# Patient Record
Sex: FEMALE | Race: BLACK OR AFRICAN AMERICAN | NOT HISPANIC OR LATINO | Employment: OTHER | URBAN - METROPOLITAN AREA
[De-identification: names, ages, dates, MRNs, and addresses within clinical notes are randomized per-mention and may not be internally consistent; named-entity substitution may affect disease eponyms.]

---

## 2018-06-14 ENCOUNTER — OFFICE VISIT (OUTPATIENT)
Dept: URGENT CARE | Facility: CLINIC | Age: 53
End: 2018-06-14
Payer: MEDICAID

## 2018-06-14 VITALS
OXYGEN SATURATION: 99 % | BODY MASS INDEX: 48.37 KG/M2 | SYSTOLIC BLOOD PRESSURE: 128 MMHG | HEART RATE: 71 BPM | HEIGHT: 63 IN | TEMPERATURE: 98 F | DIASTOLIC BLOOD PRESSURE: 80 MMHG | WEIGHT: 273 LBS

## 2018-06-14 DIAGNOSIS — R21 RASH AND NONSPECIFIC SKIN ERUPTION: Primary | ICD-10-CM

## 2018-06-14 PROCEDURE — 99213 OFFICE O/P EST LOW 20 MIN: CPT | Mod: S$GLB,,, | Performed by: PHYSICIAN ASSISTANT

## 2018-06-14 RX ORDER — CETIRIZINE HYDROCHLORIDE 10 MG/1
10 TABLET ORAL DAILY
Qty: 10 TABLET | Refills: 0 | Status: SHIPPED | OUTPATIENT
Start: 2018-06-14 | End: 2023-07-25

## 2018-06-14 RX ORDER — PREDNISONE 20 MG/1
20 TABLET ORAL 2 TIMES DAILY
Qty: 8 TABLET | Refills: 0 | Status: SHIPPED | OUTPATIENT
Start: 2018-06-14 | End: 2018-06-18

## 2018-06-15 NOTE — PROGRESS NOTES
"Subjective:       Patient ID: Carmina Amador is a 52 y.o. female.    Vitals:  height is 5' 3" (1.6 m) and weight is 123.8 kg (273 lb). Her oral temperature is 98.2 °F (36.8 °C). Her blood pressure is 128/80 and her pulse is 71. Her oxygen saturation is 99%.     Chief Complaint: Rash    Patient complains of an itchy rash that has been present for the past 2 days.  Rash started on the right side of her abdomen but has since spread to her neck and back.  Patient denies any new medications, foods, soaps, detergents, or any other new exposures.  Patient denies taking any otc meds prior to arrival today.  Patient denies any other complaints at this time.        Rash   This is a new problem. The current episode started yesterday. The problem has been gradually worsening since onset. The affected locations include the back, neck, left shoulder and left arm. The rash is characterized by redness and itchiness. It is unknown (possibly to norco's, gingeroot) if there was an exposure to a precipitant. Pertinent negatives include no cough, diarrhea, fever, joint pain, shortness of breath, sore throat or vomiting. Treatments tried: allergy tablets. The treatment provided no relief.   Hand Pain    The incident occurred less than 1 hour ago. The incident occurred at home. Injury mechanism: pt's pointer finger went to open a door and it got caught. The pain is present in the left fingers. The pain does not radiate. The pain is at a severity of 2/10. The pain is mild. The pain has been improving since the incident. Pertinent negatives include no chest pain, numbness or tingling. The symptoms are aggravated by movement. She has tried nothing for the symptoms.     Review of Systems   Constitution: Negative for chills and fever.   HENT: Negative for sore throat.    Eyes: Negative for blurred vision.   Cardiovascular: Negative for chest pain.   Respiratory: Negative for cough, shortness of breath and wheezing.    Skin: Positive for itching " and rash.   Musculoskeletal: Negative for back pain and joint pain.   Gastrointestinal: Negative for abdominal pain, diarrhea, nausea and vomiting.   Neurological: Negative for headaches, numbness and tingling.   Psychiatric/Behavioral: The patient is not nervous/anxious.    All other systems reviewed and are negative.      Objective:      Physical Exam   Constitutional: She is oriented to person, place, and time. She appears well-developed and well-nourished.   HENT:   Head: Normocephalic and atraumatic. Head is without abrasion, without contusion and without laceration.   Right Ear: External ear normal.   Left Ear: External ear normal.   Nose: Nose normal.   Mouth/Throat: Oropharynx is clear and moist.   Eyes: Conjunctivae, EOM and lids are normal. Pupils are equal, round, and reactive to light.   Neck: Trachea normal, full passive range of motion without pain and phonation normal. Neck supple.   Cardiovascular: Normal rate, regular rhythm and normal heart sounds.    Pulmonary/Chest: Effort normal and breath sounds normal. No stridor. No respiratory distress.   Musculoskeletal: Normal range of motion.   Neurological: She is alert and oriented to person, place, and time.   Skin: Skin is warm, dry and intact. Capillary refill takes less than 2 seconds. Rash noted. No abrasion, no bruising, no burn, no ecchymosis, no laceration and no lesion noted. Rash is urticarial. No erythema.        Psychiatric: She has a normal mood and affect. Her speech is normal and behavior is normal. Judgment and thought content normal. Cognition and memory are normal.   Nursing note and vitals reviewed.      Assessment:       1. Rash and nonspecific skin eruption        Plan:         Rash and nonspecific skin eruption  -     predniSONE (DELTASONE) 20 MG tablet; Take 1 tablet (20 mg total) by mouth 2 (two) times daily.  Dispense: 8 tablet; Refill: 0  -     cetirizine (ZYRTEC) 10 MG tablet; Take 1 tablet (10 mg total) by mouth once daily.   Dispense: 10 tablet; Refill: 0      Patient Instructions   1.  Take all medications as directed. If you have been prescribed antibiotics, make sure to complete them.   2.  Rest and keep yourself/patient well hydrated. For adults, it is recommended to drink at least 8-10 glasses of water daily.   3.  For patients above 6 months of age who are not allergic to and are not on anticoagulants, you can alternate Tylenol and Motrin every 4-6 hours for fever above 100.4F and/or pain.  For patients less than 6 months of age, allergic to or intolerant to NSAIDS, have gastritis, gastric ulcers, or history of GI bleeds, are pregnant, or are on anticoagulant therapy, you can take Tylenol every 4 hours as needed for fever above 100.4F and/or pain.   4. You should schedule a follow-up appointment with your Primary Care Provider/Pediatrician for recheck in 2-3 days or as directed at this visit.   5.  If your condition fails to improve in a timely manner, you should receive another evaluation by your Primary Care Provider/Pediatrician to discuss your concerns or return to urgent care for a recheck.  If your condition worsens at any time, you should report immediately to your nearest Emergency Department for further evaluation. **You must understand that you have received Urgent Care treatment only and that you may be released before all of your medical problems are known or treated. You, the patient, are responsible to arrange for follow-up care as instructed.         Nonspecific Dermatitis  Dermatitis is a skin rash caused by something that touches the skin and makes it irritated and inflamed.  Your skin may be red, swollen, dry, and may be cracked. Blisters may form and ooze. The rash will itch.  Dermatitis can form on the face and neck, backs of hands, forearms, genitals, and lower legs. Dermatitis is not passed from person to person.  Talk with your health care provider about what may have caused the rash. Common things that  cause skin allergies are metal in jewelry, plants like poison ivy or poison oak, and certain skin care products. You will need to avoid the source of your rash in the future to prevent it from coming back. In some cases, the cause of the dermatitis may not be found.  Treatment is done to relieve itching and prevent the rash from coming back. The rash should go away in a few days to a few weeks.  Home care  The health care provider may prescribe medications to relieve swelling and itching. Follow all instructions when using these medications.  · Avoid anything that heats up your skin, such as hot showers or baths, or direct sunlight. This can make itching worse.  · Stay away from whatever you think caused the rash.  · Bathe in warm, not hot, water. Apply a moisturizing lotion after bathing to prevent dry skin.  · Avoid skin irritants such as wool or silk clothing, grease, oils, harsh soaps, and detergents.  · Apply cold compresses to soothe your sores to help relieve your symptoms. Do this for 30 minutes 3 to 4 times a day. You can make a cold compress by soaking a cloth in cold water. Squeeze out excess water. You can add colloidal oatmeal to the water to help reduce itching. For severe itching in a small area, apply an ice pack wrapped in a thin towel. Do this for 20 minutes 3 to 4 times a day.  · You can also help relieve large areas of itching by taking a lukewarm bath with colloidal oatmeal added to the water.  · Use hydrocortisone cream for redness and irritation, unless another medicine was prescribed. You can also use benzocaine anesthetic cream or spray.  · Use oral diphenhydramine to help reduce itching. This is an antihistamine you can buy at drug and grocery stores. It can make you sleepy, so use lower doses during the daytime. Or you can use loratadine. This is an antihistamine that will not make you sleepy. Dont use diphenhydramine if you have glaucoma or have trouble urinating because of an enlarged  prostate.  · Wash your hands or use an antibacterial gel often to prevent the spread of the rash.  Follow-up care  Follow up with your health care provider. Make an appointment with your health care provider if your symptoms do not get better in the next 1 to 2 weeks.  When to seek medical advice  Call your health care provider right away if any of these occur:  · Spreading of the rash to other parts of your body  · Severe swelling of your face, eyelids, mouth, throat or tongue  · Trouble urinating due to swelling in the genital area  · Fever of 100.4°F (38°C) or higher  · Redness or swelling that gets worse  · Pain that gets worse  · Foul-smelling fluid leaking from the skin  · Yellow-brown crusts on the open blisters  · Joint pain   Date Last Reviewed: 7/23/2014  © 2993-0527 [employer]. [address] [place] [address], [place] [address]. All rights reserved. This information is not intended as a substitute for professional medical care. Always follow your healthcare professional's instructions.

## 2018-06-15 NOTE — PATIENT INSTRUCTIONS
1.  Take all medications as directed. If you have been prescribed antibiotics, make sure to complete them.   2.  Rest and keep yourself/patient well hydrated. For adults, it is recommended to drink at least 8-10 glasses of water daily.   3.  For patients above 6 months of age who are not allergic to and are not on anticoagulants, you can alternate Tylenol and Motrin every 4-6 hours for fever above 100.4F and/or pain.  For patients less than 6 months of age, allergic to or intolerant to NSAIDS, have gastritis, gastric ulcers, or history of GI bleeds, are pregnant, or are on anticoagulant therapy, you can take Tylenol every 4 hours as needed for fever above 100.4F and/or pain.   4. You should schedule a follow-up appointment with your Primary Care Provider/Pediatrician for recheck in 2-3 days or as directed at this visit.   5.  If your condition fails to improve in a timely manner, you should receive another evaluation by your Primary Care Provider/Pediatrician to discuss your concerns or return to urgent care for a recheck.  If your condition worsens at any time, you should report immediately to your nearest Emergency Department for further evaluation. **You must understand that you have received Urgent Care treatment only and that you may be released before all of your medical problems are known or treated. You, the patient, are responsible to arrange for follow-up care as instructed.         Nonspecific Dermatitis  Dermatitis is a skin rash caused by something that touches the skin and makes it irritated and inflamed.  Your skin may be red, swollen, dry, and may be cracked. Blisters may form and ooze. The rash will itch.  Dermatitis can form on the face and neck, backs of hands, forearms, genitals, and lower legs. Dermatitis is not passed from person to person.  Talk with your health care provider about what may have caused the rash. Common things that cause skin allergies are metal in jewelry, plants like poison  ivy or poison oak, and certain skin care products. You will need to avoid the source of your rash in the future to prevent it from coming back. In some cases, the cause of the dermatitis may not be found.  Treatment is done to relieve itching and prevent the rash from coming back. The rash should go away in a few days to a few weeks.  Home care  The health care provider may prescribe medications to relieve swelling and itching. Follow all instructions when using these medications.  · Avoid anything that heats up your skin, such as hot showers or baths, or direct sunlight. This can make itching worse.  · Stay away from whatever you think caused the rash.  · Bathe in warm, not hot, water. Apply a moisturizing lotion after bathing to prevent dry skin.  · Avoid skin irritants such as wool or silk clothing, grease, oils, harsh soaps, and detergents.  · Apply cold compresses to soothe your sores to help relieve your symptoms. Do this for 30 minutes 3 to 4 times a day. You can make a cold compress by soaking a cloth in cold water. Squeeze out excess water. You can add colloidal oatmeal to the water to help reduce itching. For severe itching in a small area, apply an ice pack wrapped in a thin towel. Do this for 20 minutes 3 to 4 times a day.  · You can also help relieve large areas of itching by taking a lukewarm bath with colloidal oatmeal added to the water.  · Use hydrocortisone cream for redness and irritation, unless another medicine was prescribed. You can also use benzocaine anesthetic cream or spray.  · Use oral diphenhydramine to help reduce itching. This is an antihistamine you can buy at drug and grocery stores. It can make you sleepy, so use lower doses during the daytime. Or you can use loratadine. This is an antihistamine that will not make you sleepy. Dont use diphenhydramine if you have glaucoma or have trouble urinating because of an enlarged prostate.  · Wash your hands or use an antibacterial gel often  to prevent the spread of the rash.  Follow-up care  Follow up with your health care provider. Make an appointment with your health care provider if your symptoms do not get better in the next 1 to 2 weeks.  When to seek medical advice  Call your health care provider right away if any of these occur:  · Spreading of the rash to other parts of your body  · Severe swelling of your face, eyelids, mouth, throat or tongue  · Trouble urinating due to swelling in the genital area  · Fever of 100.4°F (38°C) or higher  · Redness or swelling that gets worse  · Pain that gets worse  · Foul-smelling fluid leaking from the skin  · Yellow-brown crusts on the open blisters  · Joint pain   Date Last Reviewed: 7/23/2014  © 4771-3895 [employer]. [address] [place] [address], [place] [address]. All rights reserved. This information is not intended as a substitute for professional medical care. Always follow your healthcare professional's instructions.

## 2019-07-31 ENCOUNTER — OFFICE VISIT (OUTPATIENT)
Dept: URGENT CARE | Facility: CLINIC | Age: 54
End: 2019-07-31
Payer: MEDICAID

## 2019-07-31 VITALS
WEIGHT: 268 LBS | HEART RATE: 80 BPM | SYSTOLIC BLOOD PRESSURE: 126 MMHG | OXYGEN SATURATION: 99 % | HEIGHT: 64 IN | DIASTOLIC BLOOD PRESSURE: 72 MMHG | BODY MASS INDEX: 45.75 KG/M2 | TEMPERATURE: 97 F

## 2019-07-31 DIAGNOSIS — S60.229A CONTUSION OF HAND, UNSPECIFIED LATERALITY, INITIAL ENCOUNTER: ICD-10-CM

## 2019-07-31 DIAGNOSIS — S69.92XA INJURY OF LEFT HAND, INITIAL ENCOUNTER: Primary | ICD-10-CM

## 2019-07-31 DIAGNOSIS — S69.91XA INJURY OF RIGHT HAND, INITIAL ENCOUNTER: ICD-10-CM

## 2019-07-31 PROCEDURE — 73130 X-RAY EXAM OF HAND: CPT | Mod: RT,S$GLB,, | Performed by: RADIOLOGY

## 2019-07-31 PROCEDURE — 99214 OFFICE O/P EST MOD 30 MIN: CPT | Mod: S$GLB,,, | Performed by: FAMILY MEDICINE

## 2019-07-31 PROCEDURE — 73130 X-RAY EXAM OF HAND: CPT | Mod: LT,S$GLB,, | Performed by: RADIOLOGY

## 2019-07-31 PROCEDURE — 73130 XR HAND COMPLETE 3 VIEW LEFT: ICD-10-PCS | Mod: LT,S$GLB,, | Performed by: RADIOLOGY

## 2019-07-31 PROCEDURE — 99214 PR OFFICE/OUTPT VISIT, EST, LEVL IV, 30-39 MIN: ICD-10-PCS | Mod: S$GLB,,, | Performed by: FAMILY MEDICINE

## 2019-07-31 RX ORDER — NAPROXEN 500 MG/1
500 TABLET ORAL 2 TIMES DAILY WITH MEALS
Qty: 20 TABLET | Refills: 0 | Status: SHIPPED | OUTPATIENT
Start: 2019-07-31 | End: 2020-01-08 | Stop reason: ALTCHOICE

## 2019-07-31 NOTE — PATIENT INSTRUCTIONS
Please drink plenty of fluids.  Please get plenty of rest.  Please return here or go to the Emergency Department for any concerns or worsening of condition.  If you were prescribed a narcotic medication, do not drive or operate heavy equipment or machinery while taking these medications.  If you were not prescribed an anti-inflammatory medication, and if you do not have any history of stomach/intestinal ulcers, or kidney disease, or are not taking a blood thinner such as Coumadin, Plavix, Pradaxa, Eloquis, or Xaralta for example, it is OK to take over the counter Ibuprofen or Advil or Motrin or Aleve as directed.  Do not take these medications on an empty stomach.  Rest, ice, compression and elevation to the affected joint or limb as needed.    If you were given a sling, wear it for comfort until follow up as arranged.  If you were given or placed in a splint, wear it until your follow up visit or recheck.  If you  smoke, please stop smoking.       Please follow up with your primary care doctor or specialist as needed.    Natalie Price MD  [phone]    You must understand that you have received treatment at an Urgent Care facility only, and that you may be  released before all of your medical problems are known or treated. Urgent Care facilities are not equipped to  handle life threatening emergencies. It is recommended that you seek care at an Emergency Department for  further evaluation of worsening or concerning symptoms, or possibly life threatening conditions as  discussed.      Hand Contusion  You have a contusion. This is also called a bruise. There is swelling and some bleeding under the skin, but no broken bones. This injury generally takes a few days to a few weeks to heal.  During that time, the bruise will typically change in color from reddish, to purple-blue, to greenish-yellow, then to yellow-brown.  Home care  · Elevate the hand to reduce pain and swelling. As much as possible, sit or lie down  with the hand raised about the level of your heart. This is especially important during the first 48 hours.  · Ice the hand to help reduce pain and swelling. Wrap a cold source (ice pack or ice cubes in a plastic bag) in a thin towel. Apply to the bruised area for 20 minutes every 1 to 2 hours the first day. Continue this 3 to 4 times a day until the pain and swelling goes away.  · Unless another medicine was prescribed, you can take acetaminophen, ibuprofen, or naproxen to control pain. (If you have chronic liver or kidney disease or ever had a stomach ulcer or gastrointestinal bleeding, talk with your doctor before using these medicines.)  Follow up  Follow up with your healthcare provider or our staff as advised. Call if you are not improving within 1 to 2 weeks.  When to seek medical advice   Call your healthcare provider right away if you have any of the following:  · Increased pain or swelling  · Arm becomes cold, blue, numb or tingly  · Signs of infection: Warmth, drainage, or increased redness or pain around the bruise  · Inability to move the injured hand   · Frequent bruising for unknown reasons  Date Last Reviewed: 2/1/2017  © 9332-0970 [employer]. [address] [place] [address], [place] [address]. All rights reserved. This information is not intended as a substitute for professional medical care. Always follow your healthcare professional's instructions.

## 2019-08-03 ENCOUNTER — TELEPHONE (OUTPATIENT)
Dept: URGENT CARE | Facility: CLINIC | Age: 54
End: 2019-08-03

## 2019-10-31 ENCOUNTER — OFFICE VISIT (OUTPATIENT)
Dept: URGENT CARE | Facility: CLINIC | Age: 54
End: 2019-10-31
Payer: MEDICAID

## 2019-10-31 VITALS
DIASTOLIC BLOOD PRESSURE: 63 MMHG | HEART RATE: 69 BPM | WEIGHT: 270 LBS | OXYGEN SATURATION: 99 % | SYSTOLIC BLOOD PRESSURE: 135 MMHG | TEMPERATURE: 97 F | BODY MASS INDEX: 46.35 KG/M2

## 2019-10-31 DIAGNOSIS — M25.561 RIGHT ANTERIOR KNEE PAIN: ICD-10-CM

## 2019-10-31 DIAGNOSIS — S00.511A ABRASION OF LIP, INITIAL ENCOUNTER: Primary | ICD-10-CM
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
## 2019-10-31 DIAGNOSIS — M79.18 CERVICAL MYOFASCIAL PAIN SYNDROME: ICD-10-CM

## 2019-10-31 PROCEDURE — 73562 X-RAY EXAM OF KNEE 3: CPT | Mod: RT,S$GLB,, | Performed by: RADIOLOGY

## 2019-10-31 PROCEDURE — 72040 XR CERVICAL SPINE 2 OR 3 VIEWS: ICD-10-PCS | Mod: S$GLB,,, | Performed by: RADIOLOGY

## 2019-10-31 PROCEDURE — 99214 PR OFFICE/OUTPT VISIT, EST, LEVL IV, 30-39 MIN: ICD-10-PCS | Mod: S$GLB,,, | Performed by: NURSE PRACTITIONER

## 2019-10-31 PROCEDURE — 72040 X-RAY EXAM NECK SPINE 2-3 VW: CPT | Mod: S$GLB,,, | Performed by: RADIOLOGY

## 2019-10-31 PROCEDURE — 99214 OFFICE O/P EST MOD 30 MIN: CPT | Mod: S$GLB,,, | Performed by: NURSE PRACTITIONER

## 2019-10-31 PROCEDURE — 73562 XR KNEE 3 VIEW RIGHT: ICD-10-PCS | Mod: RT,S$GLB,, | Performed by: RADIOLOGY

## 2019-10-31 NOTE — PROGRESS NOTES
Subjective:       Patient ID: Carmina Amador is a 54 y.o. female.    Vitals:  weight is 122.5 kg (270 lb). Her oral temperature is 97 °F (36.1 °C). Her blood pressure is 135/63 and her pulse is 69. Her oxygen saturation is 99%.     Chief Complaint: Facial Injury (lip injury)    Pt states she got up to go throw something away, caught foot on a table leg and tripped hitting right knee and face on tile floor. No loc, no dizziness, no vision changes, no cp or sob, no vomiting, no numbness, tingling, numbness or weakness to extremities. Pt had no symptoms prior fall, states it was an accident.     Facial Injury    The incident occurred 1 to 3 hours ago. The injury mechanism was a fall. There was no loss of consciousness. The volume of blood lost was minimal. The quality of the pain is described as aching. The pain is at a severity of 8/10. The pain is moderate. The pain has been constant since the injury. Pertinent negatives include no blurred vision, headaches, memory loss, numbness, vomiting or weakness. She has tried ice for the symptoms. The treatment provided mild relief.       Constitution: Negative for chills, fever and generalized weakness.   HENT: Positive for facial swelling and facial trauma. Negative for ear pain, ear discharge, trouble swallowing and voice change.    Neck: Negative for neck pain, neck stiffness, painful lymph nodes and neck swelling.   Cardiovascular: Negative for chest trauma and chest pain.   Eyes: Negative for eye trauma, eye pain, photophobia, double vision and blurred vision.   Respiratory: Negative for chest tightness and shortness of breath.    Gastrointestinal: Negative for abdominal trauma, abdominal pain, vomiting and rectal bleeding.   Genitourinary: Negative for genital trauma and pelvic pain.   Musculoskeletal: Positive for pain, trauma and joint pain (right knee). Negative for joint swelling and abnormal ROM of joint.   Skin: Positive for wound (oral). Negative for color change,  abrasion, laceration and bruising.   Neurological: Negative for dizziness, light-headedness, passing out, facial drooping, speech difficulty, coordination disturbances, headaches, altered mental status, loss of consciousness, numbness and tingling.   Hematologic/Lymphatic: Negative for swollen lymph nodes, easy bruising/bleeding and history of bleeding disorder. Does not bruise/bleed easily.   Psychiatric/Behavioral: Negative for altered mental status.       Objective:      Physical Exam   Constitutional: She is oriented to person, place, and time. She appears well-developed and well-nourished. She is cooperative.  Non-toxic appearance. She does not have a sickly appearance. She does not appear ill. No distress.   HENT:   Head: Normocephalic. Head is with abrasion (lips). Head is without Whitaker's sign, without contusion and without laceration.   Right Ear: Hearing, tympanic membrane, external ear and ear canal normal. No hemotympanum.   Left Ear: Hearing, tympanic membrane, external ear and ear canal normal. No hemotympanum.   Nose: Nose normal. No mucosal edema, rhinorrhea or nasal deformity. No epistaxis. Right sinus exhibits no maxillary sinus tenderness and no frontal sinus tenderness. Left sinus exhibits no maxillary sinus tenderness and no frontal sinus tenderness.   Mouth/Throat: Uvula is midline, oropharynx is clear and moist and mucous membranes are normal. Mucous membranes are not pale. She has dentures (upper, no notable broken area). Oral lesions present. No trismus in the jaw. Normal dentition. Dental caries (several lower teeth have been previously pulled, has front teeth, no dental tenderness or notable looseness or fracture noted to lower teeth) present. No dental abscesses or uvula swelling. No posterior oropharyngeal edema, posterior oropharyngeal erythema, tonsillar abscesses or cobblestoning. Tonsils are 0 on the right. Tonsils are 0 on the left.       No ttp over TMJ, no trismus. Airway patent.  No tongue injury.   Eyes: Pupils are equal, round, and reactive to light. Conjunctivae, EOM and lids are normal. Right eye exhibits no discharge. Left eye exhibits no discharge. No scleral icterus.   Neck: Trachea normal, normal range of motion, full passive range of motion without pain and phonation normal. Neck supple. No tracheal tenderness, no spinous process tenderness and no muscular tenderness present. No neck rigidity. No tracheal deviation, no edema and normal range of motion (Normal flexion and extension and left/right rotation) present.       Cardiovascular: Normal rate, regular rhythm, normal heart sounds, intact distal pulses and normal pulses.   No murmur heard.  Pulmonary/Chest: Effort normal and breath sounds normal. No respiratory distress.   Abdominal: Soft. Normal appearance and bowel sounds are normal. She exhibits no distension, no pulsatile midline mass and no mass. There is no tenderness.   Musculoskeletal: Normal range of motion. She exhibits no edema or deformity.        Right hip: Normal. She exhibits normal range of motion, normal strength, no tenderness, no bony tenderness, no swelling, no crepitus, no deformity and no laceration.        Right knee: She exhibits bony tenderness (patellar). She exhibits normal range of motion, no swelling, no effusion, no ecchymosis, no deformity, no laceration, no erythema, normal alignment, no LCL laxity, normal patellar mobility, normal meniscus and no MCL laxity. Tenderness found. Patellar tendon (tendon to overlying patellar) tenderness noted.        Left knee: Normal. She exhibits normal range of motion, no swelling, no effusion, no ecchymosis, no deformity and no bony tenderness. No tenderness found.        Right ankle: She exhibits normal range of motion, no swelling, no ecchymosis, no deformity, no laceration and normal pulse. No tenderness. No lateral malleolus, no medial malleolus, no AITFL, no CF ligament, no posterior TFL, no head of 5th  metatarsal and no proximal fibula tenderness found. Achilles tendon exhibits no pain, no defect and normal Silver's test results.        Thoracic back: Normal. She exhibits normal range of motion, no tenderness, no bony tenderness, no swelling, no edema, no deformity, no laceration, no pain, no spasm and normal pulse.        Lumbar back: Normal. She exhibits normal range of motion, no tenderness, no bony tenderness, no swelling, no edema, no deformity, no laceration, no pain, no spasm and normal pulse.   Right knee stable, no deformity, no abrasion, no visible contusion. Knee is stable. Above and below joints normal. ambulating with normal gait, no difficulty.   Neurological: She is alert and oriented to person, place, and time. She has normal strength. She displays no atrophy and no tremor. No cranial nerve deficit or sensory deficit. She exhibits normal muscle tone. She displays no seizure activity. Coordination normal. GCS eye subscore is 4. GCS verbal subscore is 5. GCS motor subscore is 6.   Skin: Skin is warm, dry, intact, not diaphoretic and not pale. Capillary refill takes less than 2 seconds. not right knee and not right ankleabrasion, burn, bruising and ecchymosis  Psychiatric: She has a normal mood and affect. Her speech is normal and behavior is normal. Judgment and thought content normal. Cognition and memory are normal.   Nursing note and vitals reviewed.        Assessment:       1. Right anterior knee pain    2. Fall, initial encounter    3. Abrasion of lip, initial encounter    4. Cervical myofascial pain syndrome        Plan:     xrays of neck and right knee with OA evidence which pt states she is aware of already, no fractures. No red flags on exam. BP elevation, no evidence end organ damage on exam, pt with htn.     Right anterior knee pain  -     XR KNEE 3 VIEW RIGHT; Future; Expected date: 10/31/2019    Fall, initial encounter    Abrasion of lip, initial encounter    Cervical myofascial pain  syndrome  -     X-Ray Cervical Spine 2 or 3 Views; Future; Expected date: 10/31/2019    Xr Knee 3 View Right    Result Date: 10/31/2019  EXAMINATION: XR KNEE 3 VIEW RIGHT CLINICAL HISTORY: fall with anterior knee pain; Pain in right knee TECHNIQUE: AP, lateral, and Merchant views of the right knee were performed. COMPARISON: 04/01/2019 FINDINGS: Three views right knee. Degenerative changes are noted of the knee, most significantly involving the lateral compartment.  No large knee joint effusion.  No acute displaced fracture or dislocation of the knee.     1. No acute displaced fracture or dislocation of the knee. Electronically signed by: Elliot Morris MD Date:    10/31/2019 Time:    13:22    X-ray Cervical Spine 2 Or 3 Views    Result Date: 10/31/2019  EXAMINATION: XR CERVICAL SPINE 2 OR 3 VIEWS CLINICAL HISTORY: fall, no midline ttp;  Myalgia, other site FINDINGS: Three views: There is moderate DJD between C3 and T1.  The odontoid prevertebral soft tissues and posterior elements are intact.  No fracture, dislocation bone destruction seen.     DJD. Electronically signed by: Amilcar Cavanaugh MD Date:    10/31/2019 Time:    13:23       Patient Instructions       Tylenol as per bottle instructions as needed for pain.  Ice to knee and lip as needed for pain and swelling next 48hrs.     Elevated Blood Pressure  Your blood pressure was elevated during your visit to the urgent care today.  It was not so high that immediate care was needed, but it is recommended that you monitor your blood pressure over the next week or two to make sure that it is not staying elevated.  If you are on blood pressure medication currently, continue as already prescribed. Please have your blood pressure taken 2-3 times daily at different times of the day.  Keep a log of these blood pressure readings and take it with you to see your Primary Care Physician.  Bring today's discharge papers as well to your follow up appointment. If your blood  pressure is consistently above 140/90, you should follow-up with your PCP without delay. If you develop chest pain, shortness of breath, dizziness, vision changes, or any other concerning symptoms, you should seek immediate care in the Emergency Department.          Lip or Mouth Laceration  A laceration is a cut through the skin. When the cut is on the outside of the lip, it may be closed with stitches, surgical tape, or skin glue. Cuts inside the mouth may be sutured or left open, depending on the size. When stitches are used in the mouth, they are usually the kind that dissolve.  A tetanus shot may be given if you are not current on this vaccination and the object that caused the cut may lead to tetanus.    Home care  If you were given an antibiotic to prevent infection, do not stop taking this medication until you have finished the prescribed course or the healthcare provider tells you to stop.  The healthcare provider may prescribe medications for pain. Follow instructions for taking these medications.   Follow the healthcare providers instructions on how to care for the cut.  Wash your hands with soap and warm water before and after caring for your cut. This helps prevent infection.  If the cut is inside your mouth, clean the wound by rinsing your mouth after each meal and at bedtime with a mixture of equal parts water and hydrogen peroxide. (Do not swallow!) Or, you can use a cotton swab to apply hydrogen peroxide directly onto the cut.  Mouth wounds can cause pain when chewing. Soft foods can help with this. If needed, use a local, over-the-counter numbing solution for pain relief, such as one for teething babies. Apply this directly to the wound with a cotton-tip swab or your finger.  If the wound is bandaged, leave the original bandage in place for 24 hours. Replace it if it becomes wet or dirty. After 24 hours, change it once a day or as directed.  Clean the wound daily. First, remove the bandage. Then  wash the area gently with soap and warm water, or as directed by your childs provider. Use a wet cotton swab to loosen and remove any blood or crust that forms. After cleaning, apply a thin layer of antibiotic ointment if advised. Then put on a new bandage.  If the cut is on the outside of the lip and sutures were used, you may shower as usual after the first 24 hours, but do not put your head under water or swim until the sutures are removed. After removing the bandage, wash the area with soap and water. Use a wet cotton swab to loosen and remove any blood or crust that forms. After cleaning, keep the wound clean and dry. Talk with your doctor before applying any antibiotic ointment to the wound. You may apply an adhesive bandage or leave the wound open. Unless told otherwise, sutures on the inside of the mouth will likely dissolve on their own.  If skin glue was used, do not scratch, rub, or pick at the adhesive film. Do not place tape directly over the film. Do not apply liquid, ointment, or creams to the wound while the film is in place. Do not clean the wound with peroxide and do not apply ointment. Avoid activities that cause heavy sweating until the film has fallen off. Protect the wound from prolonged exposure to sunlight or tanning lamps. You may shower as usual but do not soak the wound in water (no swimming).  Follow-up care  Follow up with your healthcare provider as directed. If you have sutures that won't dissolve on their own, return as directed to have them removed.  When to seek medical advice  Call your healthcare provider right away if any of these occur:  Wound bleeding not controlled by direct pressure  Signs of infection, including increasing pain in the wound, increasing wound redness or swelling, or pus or bad odor coming from the wound  Fever of 100.4°F (38.ºC) or higher or as directed by your healthcare provider  Stitches come apart or fall out or surgical tape falls off before 5  days  Wound edges re-open  Wound changes colors  Numbness around the wound   Date Last Reviewed: 6/10/2015  © 7469-2994 [employer]. [address] [place] [address], [place] [address]. All rights reserved. This information is not intended as a substitute for professional medical care. Always follow your healthcare professional's instructions.      Knee Pain  Knee pain is very common. Its especially common in active people who put a lot of pressure on their knees, like runners. It affects women more often than men.  Your kneecap (patella) is a thick, round bone. It covers and protects the front portion of your knee joint. It moves along a groove in your thighbone (femur) as part of the patellofemoral joint. A layer of cartilage surrounds the underside of your kneecap. This layer protects it from grinding against your femur.  When this cartilage softens and breaks down, it can cause knee pain. This is partly because of repetitive stress. The stress irritates the lining of the joint. This causes pain in the underlying bone.  What causes knee pain?  Many things can cause knee pain. You may have more than one cause. Some of these include:  Overuse of the knee joint  The kneecap doesnt line up with the tissue around it  Damage to small nerves in the area  Damage to the ligament-like structure that holds the kneecap in place (retinaculum)  Breakdown of the bone under the cartilage  Swelling in the soft tissues around the kneecap  Injury  You might be more likely to have knee pain if you:  Exercise a lot  Recently increased the intensity of your workouts  Have a body mass index (BMI) greater than 25  Have poor alignment of your kneecap  Walk with your feet turned overly outward or inward  Have weakness in surrounding muscle groups (inner quad or hip adductor muscles)  Have too much tightness in surrounding muscle groups (hamstrings or iliotibial band)  Have a recent history of injury to the area  Are female  Symptoms  of knee pain  This type of knee pain is a dull, aching pain in the front of the knee in the area under and around the kneecap. This pain may start quickly or slowly. Your pain might be worse when you squat, run, or sit for a long time. You might also sometimes feel like your knee is giving out. You may have symptoms in one or both of your knees.  Diagnosing knee pain  Your healthcare provider will ask about your medical history and your symptoms. Be sure to describe any activities that make your knee pain worse. He or she will look at your knee. This will include tests of your range of motion, strength, and areas of pain of your knee. Your knee alignment will be checked.  Your healthcare provider will need to rule out other causes of your knee pain, such as arthritis. You may need an imaging test, such as an X-ray or MRI.  Treatment for knee pain  Treatments that can help ease your symptoms may include:  Avoiding activities for a while that make your pain worse, returning to activity over time  Icing the outside of your knee when it causes you pain  Taking over-the-counter pain medicine  Wearing a knee brace or taping your knee to support it  Wearing special shoe inserts to help keep your feet in the proper alignment  Doing special exercises to stretch and strengthen the muscles around your hip and your knee  These steps help most people manage knee pain. But some cases of knee pain need to be treated with surgery. You may need surgery right away. Or you may need it later if other treatments dont work. Your healthcare provider may refer you to an orthopedic surgeon. He or she will talk with you about your choices.  Preventing knee pain  Losing weight and correcting excess muscle tightness or muscle weakness may help lower your risk.  In some cases, you can prevent knee pain. To help prevent a flare-up of knee pain, you do these things:  Regularly do all the exercises your doctor or physical therapist  advises  Support your knee as advised by your doctor or physical therapist  Increase training gradually, and ease up on training when needed  Have an expert check your gait for running or other sporting activities  Stretch properly before and after exercise  Replace your running shoes regularly  Lose excess weight     When to call your healthcare provider  Call your healthcare provider right away if:  Your symptoms dont get better after a few weeks of treatment  You have any new symptoms   Date Last Reviewed: 4/1/2017  © 5991-1551 [employer]. [address] [place] [id number]. All rights reserved. This information is not intended as a substitute for professional medical care. Always follow your healthcare professional's instructions.        Understanding Cervical Strain    There are 7 bones (vertebrae) in the neck that are part of the spine. These are called the cervical spine. Cervical strain is a medical term for neck pain. The neck has several layers of muscles. These are connected with tendons to the cervical spine and other bones. Neck pain is often the result of injury to these muscles and tendons.  Causes of cervical strain  Different types of stress on the neck can damage muscles and tendons (soft tissues) and cause cervical strain. Cervical tissues can be damaged by:  The neck being forced past its normal range of motion, such as in a car accident or sports injury  Constant, low-level stress, such as from poor posture or a poorly set-up workspace  Symptoms of cervical strain  These may include:  Neck pain or stiffness  Pain in the shoulders or upper back  Muscle spasms  Headache, often starting at the base of the neck  Irritability, difficulty concentrating, or sleeplessness  Treatment for cervical strain  This problem often gets better on its own. Treatments aim to reduce pain and inflammation and increase the range of motion of the neck. Possible treatments  include:  Over-the-counter or prescription pain medicine. These help relieve pain and inflammation.  Stretching exercises to decrease neck stiffness.  Massage to decrease neck stiffness.  Cold or heat pack. These help reduce pain and swelling.  Call 911  Call emergency services right away if you have any of these:  Face drooping or numbness  Numbness or weakness, especially in the arms or on one side  Slurred speech or difficulty speaking  Blurred vision   When to call your healthcare provider  Call your healthcare provider right away if you have any of these:  Fever of 100.4°F (38°C) or higher, or as directed  Pain or stiffness that gets worse  Symptoms that dont get better, or get worse  Numbness, tingling, weakness or shooting pains into the arms or legs  New symptoms  Date Last Reviewed: 3/10/2016  © 4876-7084 [employer]. [address] [place] [address]. All rights reserved. This information is not intended as a substitute for professional medical care. Always follow your healthcare professional's instructions.        ·   ·   · Follow up with your primary care in 2-5 days if symptoms have not improved, or you may return here.  · If you were referred to a specialist, please follow up with that specialty.  · If you were prescribed antibiotics, please take them to completion.  · If you were prescribed a narcotic or any medication with sedative effects, do not drive or operate heavy equipment or machinery while taking these medications.  · You must understand that you have received treatment at an Urgent Care facility only, and that you may be released before all of your medical problems are known or treated. Urgent Care facilities are not equipped to handle life threatening emergencies. It is recommended that you go to an Emergency Department for further evaluation of worsening or concerning symptoms, or possibly life threatening conditions as discussed.                                         If you  smoke, please stop smoking

## 2019-10-31 NOTE — PATIENT INSTRUCTIONS
Tylenol as per bottle instructions as needed for pain.  Ice to knee and lip as needed for pain and swelling next 48hrs.     Elevated Blood Pressure  Your blood pressure was elevated during your visit to the urgent care today.  It was not so high that immediate care was needed, but it is recommended that you monitor your blood pressure over the next week or two to make sure that it is not staying elevated.  If you are on blood pressure medication currently, continue as already prescribed. Please have your blood pressure taken 2-3 times daily at different times of the day.  Keep a log of these blood pressure readings and take it with you to see your Primary Care Physician.  Bring today's discharge papers as well to your follow up appointment. If your blood pressure is consistently above 140/90, you should follow-up with your PCP without delay. If you develop chest pain, shortness of breath, dizziness, vision changes, or any other concerning symptoms, you should seek immediate care in the Emergency Department.          Lip or Mouth Laceration  A laceration is a cut through the skin. When the cut is on the outside of the lip, it may be closed with stitches, surgical tape, or skin glue. Cuts inside the mouth may be sutured or left open, depending on the size. When stitches are used in the mouth, they are usually the kind that dissolve.  A tetanus shot may be given if you are not current on this vaccination and the object that caused the cut may lead to tetanus.    Home care  If you were given an antibiotic to prevent infection, do not stop taking this medication until you have finished the prescribed course or the healthcare provider tells you to stop.  The healthcare provider may prescribe medications for pain. Follow instructions for taking these medications.   Follow the healthcare providers instructions on how to care for the cut.  Wash your hands with soap and warm water before and after caring for your cut. This  helps prevent infection.  If the cut is inside your mouth, clean the wound by rinsing your mouth after each meal and at bedtime with a mixture of equal parts water and hydrogen peroxide. (Do not swallow!) Or, you can use a cotton swab to apply hydrogen peroxide directly onto the cut.  Mouth wounds can cause pain when chewing. Soft foods can help with this. If needed, use a local, over-the-counter numbing solution for pain relief, such as one for teething babies. Apply this directly to the wound with a cotton-tip swab or your finger.  If the wound is bandaged, leave the original bandage in place for 24 hours. Replace it if it becomes wet or dirty. After 24 hours, change it once a day or as directed.  Clean the wound daily. First, remove the bandage. Then wash the area gently with soap and warm water, or as directed by your childs provider. Use a wet cotton swab to loosen and remove any blood or crust that forms. After cleaning, apply a thin layer of antibiotic ointment if advised. Then put on a new bandage.  If the cut is on the outside of the lip and sutures were used, you may shower as usual after the first 24 hours, but do not put your head under water or swim until the sutures are removed. After removing the bandage, wash the area with soap and water. Use a wet cotton swab to loosen and remove any blood or crust that forms. After cleaning, keep the wound clean and dry. Talk with your doctor before applying any antibiotic ointment to the wound. You may apply an adhesive bandage or leave the wound open. Unless told otherwise, sutures on the inside of the mouth will likely dissolve on their own.  If skin glue was used, do not scratch, rub, or pick at the adhesive film. Do not place tape directly over the film. Do not apply liquid, ointment, or creams to the wound while the film is in place. Do not clean the wound with peroxide and do not apply ointment. Avoid activities that cause heavy sweating until the film has  fallen off. Protect the wound from prolonged exposure to sunlight or tanning lamps. You may shower as usual but do not soak the wound in water (no swimming).  Follow-up care  Follow up with your healthcare provider as directed. If you have sutures that won't dissolve on their own, return as directed to have them removed.  When to seek medical advice  Call your healthcare provider right away if any of these occur:  Wound bleeding not controlled by direct pressure  Signs of infection, including increasing pain in the wound, increasing wound redness or swelling, or pus or bad odor coming from the wound  Fever of 100.4°F (38.ºC) or higher or as directed by your healthcare provider  Stitches come apart or fall out or surgical tape falls off before 5 days  Wound edges re-open  Wound changes colors  Numbness around the wound   Date Last Reviewed: 6/10/2015  © 9211-7705 [employer]. [address] [place] [address]. All rights reserved. This information is not intended as a substitute for professional medical care. Always follow your healthcare professional's instructions.      Knee Pain  Knee pain is very common. Its especially common in active people who put a lot of pressure on their knees, like runners. It affects women more often than men.  Your kneecap (patella) is a thick, round bone. It covers and protects the front portion of your knee joint. It moves along a groove in your thighbone (femur) as part of the patellofemoral joint. A layer of cartilage surrounds the underside of your kneecap. This layer protects it from grinding against your femur.  When this cartilage softens and breaks down, it can cause knee pain. This is partly because of repetitive stress. The stress irritates the lining of the joint. This causes pain in the underlying bone.  What causes knee pain?  Many things can cause knee pain. You may have more than one cause. Some of these include:  Overuse of the knee joint  The  kneecap doesnt line up with the tissue around it  Damage to small nerves in the area  Damage to the ligament-like structure that holds the kneecap in place (retinaculum)  Breakdown of the bone under the cartilage  Swelling in the soft tissues around the kneecap  Injury  You might be more likely to have knee pain if you:  Exercise a lot  Recently increased the intensity of your workouts  Have a body mass index (BMI) greater than 25  Have poor alignment of your kneecap  Walk with your feet turned overly outward or inward  Have weakness in surrounding muscle groups (inner quad or hip adductor muscles)  Have too much tightness in surrounding muscle groups (hamstrings or iliotibial band)  Have a recent history of injury to the area  Are female  Symptoms of knee pain  This type of knee pain is a dull, aching pain in the front of the knee in the area under and around the kneecap. This pain may start quickly or slowly. Your pain might be worse when you squat, run, or sit for a long time. You might also sometimes feel like your knee is giving out. You may have symptoms in one or both of your knees.  Diagnosing knee pain  Your healthcare provider will ask about your medical history and your symptoms. Be sure to describe any activities that make your knee pain worse. He or she will look at your knee. This will include tests of your range of motion, strength, and areas of pain of your knee. Your knee alignment will be checked.  Your healthcare provider will need to rule out other causes of your knee pain, such as arthritis. You may need an imaging test, such as an X-ray or MRI.  Treatment for knee pain  Treatments that can help ease your symptoms may include:  Avoiding activities for a while that make your pain worse, returning to activity over time  Icing the outside of your knee when it causes you pain  Taking over-the-counter pain medicine  Wearing a knee brace or taping your knee to support it  Wearing special shoe inserts  to help keep your feet in the proper alignment  Doing special exercises to stretch and strengthen the muscles around your hip and your knee  These steps help most people manage knee pain. But some cases of knee pain need to be treated with surgery. You may need surgery right away. Or you may need it later if other treatments dont work. Your healthcare provider may refer you to an orthopedic surgeon. He or she will talk with you about your choices.  Preventing knee pain  Losing weight and correcting excess muscle tightness or muscle weakness may help lower your risk.  In some cases, you can prevent knee pain. To help prevent a flare-up of knee pain, you do these things:  Regularly do all the exercises your doctor or physical therapist advises  Support your knee as advised by your doctor or physical therapist  Increase training gradually, and ease up on training when needed  Have an expert check your gait for running or other sporting activities  Stretch properly before and after exercise  Replace your running shoes regularly  Lose excess weight     When to call your healthcare provider  Call your healthcare provider right away if:  Your symptoms dont get better after a few weeks of treatment  You have any new symptoms   Date Last Reviewed: 4/1/2017  © 8206-4561 [employer]. [address] [place] [address]. All rights reserved. This information is not intended as a substitute for professional medical care. Always follow your healthcare professional's instructions.        Understanding Cervical Strain    There are 7 bones (vertebrae) in the neck that are part of the spine. These are called the cervical spine. Cervical strain is a medical term for neck pain. The neck has several layers of muscles. These are connected with tendons to the cervical spine and other bones. Neck pain is often the result of injury to these muscles and tendons.  Causes of cervical strain  Different types of stress on  the neck can damage muscles and tendons (soft tissues) and cause cervical strain. Cervical tissues can be damaged by:  The neck being forced past its normal range of motion, such as in a car accident or sports injury  Constant, low-level stress, such as from poor posture or a poorly set-up workspace  Symptoms of cervical strain  These may include:  Neck pain or stiffness  Pain in the shoulders or upper back  Muscle spasms  Headache, often starting at the base of the neck  Irritability, difficulty concentrating, or sleeplessness  Treatment for cervical strain  This problem often gets better on its own. Treatments aim to reduce pain and inflammation and increase the range of motion of the neck. Possible treatments include:  Over-the-counter or prescription pain medicine. These help relieve pain and inflammation.  Stretching exercises to decrease neck stiffness.  Massage to decrease neck stiffness.  Cold or heat pack. These help reduce pain and swelling.  Call 911  Call emergency services right away if you have any of these:  Face drooping or numbness  Numbness or weakness, especially in the arms or on one side  Slurred speech or difficulty speaking  Blurred vision   When to call your healthcare provider  Call your healthcare provider right away if you have any of these:  Fever of 100.4°F (38°C) or higher, or as directed  Pain or stiffness that gets worse  Symptoms that dont get better, or get worse  Numbness, tingling, weakness or shooting pains into the arms or legs  New symptoms  Date Last Reviewed: 3/10/2016  © 8874-2343 [employer]. [address] [place] [address], [place] [address]. All rights reserved. This information is not intended as a substitute for professional medical care. Always follow your healthcare professional's instructions.        ·   ·   · Follow up with your primary care in 2-5 days if symptoms have not improved, or you may return here.  · If you were referred to a specialist, please  follow up with that specialty.  · If you were prescribed antibiotics, please take them to completion.  · If you were prescribed a narcotic or any medication with sedative effects, do not drive or operate heavy equipment or machinery while taking these medications.  · You must understand that you have received treatment at an Urgent Care facility only, and that you may be released before all of your medical problems are known or treated. Urgent Care facilities are not equipped to handle life threatening emergencies. It is recommended that you go to an Emergency Department for further evaluation of worsening or concerning symptoms, or possibly life threatening conditions as discussed.                                        If you  smoke, please stop smoking

## 2019-11-01 NOTE — PROCEDURES
"Laceration Repair  Date/Time: 10/31/2019 11:45 AM  Performed by: Kadi Zee NP  Authorized by: Kadi Zee NP   Consent Done: Yes  Consent: Verbal consent obtained. Written consent not obtained.  Risks and benefits: risks, benefits and alternatives were discussed  Consent given by: patient  Patient understanding: patient states understanding of the procedure being performed  Patient identity confirmed:  and name  Time out: Immediately prior to procedure a "time out" was called to verify the correct patient, procedure, equipment, support staff and site/side marked as required.  Body area: mouth  Laceration length: 0.2 cm  Contaminated: none.  Foreign bodies: no foreign bodies  Tendon involvement: none  Nerve involvement: none  Vascular damage: no  Anesthesia method: none.    Anesthesia:  Local anesthesia used: no  Patient sedated: no  Preparation: Patient was prepped and draped in the usual sterile fashion.  Irrigation solution: saline  Irrigation method: syringe  Amount of cleaning: standard  Debridement: none  Skin closure: glue  Approximation: close  Approximation difficulty: simple  Patient tolerance: Patient tolerated the procedure well with no immediate complications        "

## 2019-11-04 ENCOUNTER — OFFICE VISIT (OUTPATIENT)
Dept: URGENT CARE | Facility: CLINIC | Age: 54
End: 2019-11-04
Payer: MEDICAID

## 2019-11-04 VITALS
OXYGEN SATURATION: 98 % | SYSTOLIC BLOOD PRESSURE: 127 MMHG | HEART RATE: 100 BPM | BODY MASS INDEX: 46.1 KG/M2 | WEIGHT: 270 LBS | HEIGHT: 64 IN | TEMPERATURE: 98 F | DIASTOLIC BLOOD PRESSURE: 79 MMHG

## 2019-11-04 DIAGNOSIS — S00.511A ABRASION OF LIP, INITIAL ENCOUNTER: Primary | ICD-10-CM

## 2019-11-04 PROCEDURE — 99214 OFFICE O/P EST MOD 30 MIN: CPT | Mod: S$GLB,,, | Performed by: FAMILY MEDICINE

## 2019-11-04 PROCEDURE — 99214 PR OFFICE/OUTPT VISIT, EST, LEVL IV, 30-39 MIN: ICD-10-PCS | Mod: S$GLB,,, | Performed by: FAMILY MEDICINE

## 2019-11-04 RX ORDER — NAPROXEN 500 MG/1
500 TABLET ORAL 2 TIMES DAILY WITH MEALS
Qty: 20 TABLET | Refills: 0 | Status: SHIPPED | OUTPATIENT
Start: 2019-11-04 | End: 2020-01-08 | Stop reason: ALTCHOICE

## 2019-11-04 RX ORDER — MUPIROCIN 20 MG/G
OINTMENT TOPICAL 2 TIMES DAILY
Qty: 30 G | Refills: 0 | Status: SHIPPED | OUTPATIENT
Start: 2019-11-04 | End: 2023-07-25

## 2019-11-04 RX ORDER — CEPHALEXIN 250 MG/1
250 CAPSULE ORAL 4 TIMES DAILY
Qty: 40 CAPSULE | Refills: 0 | Status: SHIPPED | OUTPATIENT
Start: 2019-11-04 | End: 2019-11-14

## 2019-11-04 NOTE — PATIENT INSTRUCTIONS
Please drink plenty of fluids.  Please get plenty of rest.  Please return here or go to the Emergency Department for any concerns or worsening of condition.  If you were given wait & see antibiotics, please wait 3-5 days before taking them, and only take them if your symptoms have worsened or not improved.  If you do begin taking the antibiotics, please take them to completion.  If you were prescribed antibiotics, please take them to completion.  If you were prescribed a narcotic medication, do not drive or operate heavy equipment or machinery while taking these medications.      If not allergic, please take over the counter Tylenol (Acetaminophen) and/or Motrin (Ibuprofen) as directed for control of pain and/or fever.    Please follow up with your primary care doctor or specialist as needed.  Natalie Price MD  [phone]    You must understand that you have received treatment at an Urgent Care facility only, and that you may be  released before all of your medical problems are known or treated. Urgent Care facilities are not equipped to  handle life threatening emergencies. It is recommended that you seek care at an Emergency Department for  further evaluation of worsening or concerning symptoms, or possibly life threatening conditions as  discussed.      Abrasions  Abrasions are skin scrapes. Their treatment depends on how large and deep the abrasion is.  Home care  You may be prescribed an antibiotic cream or ointment to apply to the wound. This helps prevent infection. Follow instructions when using this medicine.  General care  · To care for the abrasion, do the following each day for as long as directed by your healthcare provider.  ¨ If you were given a bandage, change it once a day. If your bandage sticks to the wound, soak it in warm water until it loosens.  ¨ Wash the area with soap and warm water. You may do this in a sink or under a tub faucet or shower. Rinse off the soap. Then pat the area dry  with a clean towel.  ¨ If antibiotic ointment or cream was prescribed, reapply it to the wound as directed. Cover the wound with a fresh nonstick bandage. If the bandage becomes wet or dirty, change it as soon as possible.  ¨ Some antibiotic ointments or cream can cause an allergic reaction or dermatitis. This may cause redness, itching and or hives. If this occurs, stop using the ointment immediately and wash off any remaining ointment. You may need to take some allergy medicine to relieve symptoms.  · You may use acetaminophen or ibuprofen to control pain unless another pain medicine was prescribed. Talk with your healthcare provider before using these medicines if you have chronic liver or kidney disease or ever had a stomach ulcer or GI bleeding. Dont use ibuprofen in children younger than six months old.  · Most skin wounds heal within 10 days. But an infection may occur even with treatment. So its important to watch the wound for signs of infection as listed below.  Follow-up care  Follow up with your healthcare provider, or as advised.  When to seek medical advice  Call your healthcare provider right away if any of these occur:  · Fever of 100.4ºF (38ºC) or higher, or as directed by your healthcare provider  · Increasing pain, redness, swelling, or drainage from the wound  · Bleeding from the wound that does not stop after a few minutes of steady, firm pressure  · Decreased ability to move any body part near the wound  Date Last Reviewed: 3/3/2017  © 3210-8195 [employer]. [address] [place] [address], [place] [address]. All rights reserved. This information is not intended as a substitute for professional medical care. Always follow your healthcare professional's instructions.

## 2019-11-04 NOTE — PROGRESS NOTES
"Subjective:       Patient ID: Carmina Amador is a 54 y.o. female.    Vitals:  height is 5' 4" (1.626 m) and weight is 122.5 kg (270 lb). Her oral temperature is 98.1 °F (36.7 °C). Her blood pressure is 127/79 and her pulse is 100. Her oxygen saturation is 98%.     Chief Complaint: Lip Laceration    Other   This is a new problem. Episode onset: 4 days ago. The problem occurs constantly. The problem has been gradually worsening. Pertinent negatives include no fatigue, nausea, neck pain or sore throat. Associated symptoms comments: Facial trauma. Nothing aggravates the symptoms.       Constitution: Negative for fatigue.   HENT: Positive for facial swelling (right side upper lip) and facial trauma. Negative for sore throat.    Neck: Negative for neck pain and neck stiffness.   Cardiovascular: Negative for chest trauma.   Eyes: Negative for eye trauma.   Gastrointestinal: Negative for nausea and rectal bleeding.   Genitourinary: Negative for pelvic pain.   Musculoskeletal: Positive for pain and trauma.   Skin: Positive for wound. Negative for color change, abrasion, laceration, erythema and bruising.   Neurological: Negative for loss of consciousness.       Objective:      Physical Exam   Constitutional: She is oriented to person, place, and time. She appears well-developed and well-nourished.   HENT:   Head: Normocephalic and atraumatic. Head is without abrasion, without contusion and without laceration.       Right Ear: External ear normal.   Left Ear: External ear normal.   Nose: Nose normal.   Mouth/Throat: Oropharynx is clear and moist and mucous membranes are normal.   Eyes: Pupils are equal, round, and reactive to light. Conjunctivae, EOM and lids are normal.   Neck: Trachea normal, full passive range of motion without pain and phonation normal. Neck supple.   Cardiovascular: Normal rate, regular rhythm and normal heart sounds.   Pulmonary/Chest: Effort normal and breath sounds normal. No stridor. No respiratory " distress.   Musculoskeletal: Normal range of motion.   Neurological: She is alert and oriented to person, place, and time.   Skin: Skin is warm, dry, intact and no rash. Capillary refill takes less than 2 seconds. abrasion, burn, bruising, erythema and ecchymosis  Psychiatric: She has a normal mood and affect. Her speech is normal and behavior is normal. Judgment and thought content normal. Cognition and memory are normal.   Nursing note and vitals reviewed.        Assessment:       1. Abrasion of lip, initial encounter        Plan:         Abrasion of lip, initial encounter  -     cephALEXin (KEFLEX) 250 MG capsule; Take 1 capsule (250 mg total) by mouth 4 (four) times daily. for 10 days  Dispense: 40 capsule; Refill: 0  -     mupirocin (BACTROBAN) 2 % ointment; Apply topically 2 (two) times daily.  Dispense: 30 g; Refill: 0  -     naproxen (NAPROSYN) 500 MG tablet; Take 1 tablet (500 mg total) by mouth 2 (two) times daily with meals.  Dispense: 20 tablet; Refill: 0     Please drink plenty of fluids.  Please get plenty of rest.  Please return here or go to the Emergency Department for any concerns or worsening of condition.  If you were given wait & see antibiotics, please wait 3-5 days before taking them, and only take them if your symptoms have worsened or not improved.  If you do begin taking the antibiotics, please take them to completion.  If you were prescribed antibiotics, please take them to completion.  If you were prescribed a narcotic medication, do not drive or operate heavy equipment or machinery while taking these medications.      If not allergic, please take over the counter Tylenol (Acetaminophen) and/or Motrin (Ibuprofen) as directed for control of pain and/or fever.    Please follow up with your primary care doctor or specialist as needed.  Natalie Price MD  [phone]    You must understand that you have received treatment at an Urgent Care facility only, and that you may be  released  before all of your medical problems are known or treated. Urgent Care facilities are not equipped to  handle life threatening emergencies. It is recommended that you seek care at an Emergency Department for  further evaluation of worsening or concerning symptoms, or possibly life threatening conditions as  discussed.

## 2020-01-08 ENCOUNTER — OFFICE VISIT (OUTPATIENT)
Dept: URGENT CARE | Facility: CLINIC | Age: 55
End: 2020-01-08
Payer: MEDICAID

## 2020-01-08 VITALS
SYSTOLIC BLOOD PRESSURE: 145 MMHG | DIASTOLIC BLOOD PRESSURE: 79 MMHG | HEIGHT: 64 IN | TEMPERATURE: 98 F | BODY MASS INDEX: 46.1 KG/M2 | HEART RATE: 76 BPM | OXYGEN SATURATION: 98 % | WEIGHT: 270 LBS

## 2020-01-08 DIAGNOSIS — M54.9 BACK PAIN, UNSPECIFIED BACK LOCATION, UNSPECIFIED BACK PAIN LATERALITY, UNSPECIFIED CHRONICITY: Primary | ICD-10-CM

## 2020-01-08 LAB
BILIRUB UR QL STRIP: NEGATIVE
GLUCOSE UR QL STRIP: NEGATIVE
KETONES UR QL STRIP: NEGATIVE
LEUKOCYTE ESTERASE UR QL STRIP: NEGATIVE
PH, POC UA: 5.5 (ref 5–8)
POC BLOOD, URINE: NEGATIVE
POC NITRATES, URINE: NEGATIVE
PROT UR QL STRIP: NEGATIVE
SP GR UR STRIP: 1.02 (ref 1–1.03)
UROBILINOGEN UR STRIP-ACNC: NORMAL (ref 0.1–1.1)

## 2020-01-08 PROCEDURE — 81003 POCT URINALYSIS, DIPSTICK, AUTOMATED, W/O SCOPE: ICD-10-PCS | Mod: QW,S$GLB,, | Performed by: PHYSICIAN ASSISTANT

## 2020-01-08 PROCEDURE — 81003 URINALYSIS AUTO W/O SCOPE: CPT | Mod: QW,S$GLB,, | Performed by: PHYSICIAN ASSISTANT

## 2020-01-08 PROCEDURE — 99214 PR OFFICE/OUTPT VISIT, EST, LEVL IV, 30-39 MIN: ICD-10-PCS | Mod: 25,S$GLB,, | Performed by: PHYSICIAN ASSISTANT

## 2020-01-08 PROCEDURE — 99214 OFFICE O/P EST MOD 30 MIN: CPT | Mod: 25,S$GLB,, | Performed by: PHYSICIAN ASSISTANT

## 2020-01-08 RX ORDER — NAPROXEN SODIUM 550 MG/1
550 TABLET ORAL 2 TIMES DAILY WITH MEALS
Qty: 20 TABLET | Refills: 0 | Status: SHIPPED | OUTPATIENT
Start: 2020-01-08 | End: 2020-01-18

## 2020-01-08 RX ORDER — BACLOFEN 10 MG/1
10 TABLET ORAL 2 TIMES DAILY
Qty: 20 TABLET | Refills: 0 | Status: SHIPPED | OUTPATIENT
Start: 2020-01-08 | End: 2023-07-21

## 2020-01-08 RX ORDER — PREDNISONE 20 MG/1
20 TABLET ORAL DAILY
Qty: 4 TABLET | Refills: 0 | Status: SHIPPED | OUTPATIENT
Start: 2020-01-08 | End: 2020-01-12

## 2020-01-08 NOTE — PROGRESS NOTES
"Subjective:       Patient ID: Carmina Amador is a 54 y.o. female.    Vitals:  height is 5' 4" (1.626 m) and weight is 122.5 kg (270 lb). Her oral temperature is 98.2 °F (36.8 °C). Her blood pressure is 145/79 (abnormal) and her pulse is 76. Her oxygen saturation is 98%.     Chief Complaint: Back Pain    54-year-old female presents to clinic today with complaints to her left groin area and her left lower back area.  Patient states that the pain started during her stress test last week and has persisted since.  Patient states that she had shingles in this area previously and describes the pain as a similar pain. She does report improvement in her pain with Aleve and Tylenol as well as heat.  Patient denies any fever, chills, nausea, vomiting, or urinary symptoms.  She also reports normal bowel movements.  Patient denies any other complaints at this time.     Back Pain   This is a new problem. The current episode started in the past 7 days. The problem occurs intermittently. The problem has been waxing and waning since onset. The pain is present in the lumbar spine. The quality of the pain is described as aching. Radiates to: left leg, groin area, and back. The pain is at a severity of 6/10. The pain is moderate. Associated symptoms include leg pain. Pertinent negatives include no abdominal pain, bladder incontinence, bowel incontinence, dysuria, fever, headaches, numbness, paresthesias, perianal numbness, tingling or weakness. Risk factors include obesity. Treatments tried: tylenol, naproxyn. The treatment provided moderate relief.       Constitution: Negative for chills, fatigue and fever.   Gastrointestinal: Positive for nausea. Negative for abdominal pain, vomiting, constipation, diarrhea and bowel incontinence.   Genitourinary: Negative for dysuria, urgency, bladder incontinence and hematuria.   Musculoskeletal: Positive for pain and back pain. Negative for trauma, muscle cramps and history of spine disorder. "   Skin: Negative for rash.   Neurological: Negative for coordination disturbances, headaches, numbness and tingling.       Objective:      Physical Exam   Constitutional: She is oriented to person, place, and time. Vital signs are normal. She appears well-developed and well-nourished. She is active and cooperative. No distress.   HENT:   Head: Normocephalic and atraumatic.   Nose: Nose normal.   Mouth/Throat: Oropharynx is clear and moist and mucous membranes are normal.   Eyes: Conjunctivae and lids are normal.   Neck: Trachea normal, normal range of motion, full passive range of motion without pain and phonation normal. Neck supple.   Cardiovascular: Normal rate, regular rhythm, normal heart sounds, intact distal pulses and normal pulses.   Pulmonary/Chest: Effort normal and breath sounds normal.   Abdominal: Soft. Normal appearance and bowel sounds are normal. She exhibits no abdominal bruit, no pulsatile midline mass and no mass. There is no hepatosplenomegaly. There is no tenderness. There is no rigidity, no rebound, no guarding, no CVA tenderness, no tenderness at McBurney's point and negative Abernathy's sign.       Musculoskeletal: She exhibits no edema or deformity.        Left hip: She exhibits normal range of motion, normal strength, no tenderness, no bony tenderness, no swelling, no crepitus, no deformity and no laceration.        Lumbar back: She exhibits tenderness and pain. She exhibits normal range of motion, no bony tenderness, no swelling, no edema, no deformity, no laceration, no spasm and normal pulse.        Back:    Neurological: She is alert and oriented to person, place, and time. She has normal strength and normal reflexes. No sensory deficit.   Skin: Skin is warm, dry, intact, not diaphoretic and no rash.   Psychiatric: She has a normal mood and affect. Her speech is normal and behavior is normal. Judgment and thought content normal. Cognition and memory are normal.   Nursing note and vitals  reviewed.    Results for orders placed or performed in visit on 01/08/20   POCT Urinalysis, Dipstick, Automated, W/O Scope   Result Value Ref Range    POC Blood, Urine Negative Negative    POC Bilirubin, Urine Negative Negative    POC Urobilinogen, Urine Normal 0.1 - 1.1    POC Ketones, Urine Negative Negative    POC Protein, Urine Negative Negative    POC Nitrates, Urine Negative Negative    POC Glucose, Urine Negative Negative    pH, UA 5.5 5 - 8    POC Specific Gravity, Urine 1.025 1.003 - 1.029    POC Leukocytes, Urine Negative Negative           Assessment:       1. Back pain, unspecified back location, unspecified back pain laterality, unspecified chronicity        Plan:         Back pain, unspecified back location, unspecified back pain laterality, unspecified chronicity  -     POCT Urinalysis, Dipstick, Automated, W/O Scope  -     naproxen sodium (ANAPROX) 550 MG tablet; Take 1 tablet (550 mg total) by mouth 2 (two) times daily with meals. for 10 days  Dispense: 20 tablet; Refill: 0  -     baclofen (LIORESAL) 10 MG tablet; Take 1 tablet (10 mg total) by mouth 2 (two) times daily. for 10 days  Dispense: 20 tablet; Refill: 0  -     predniSONE (DELTASONE) 20 MG tablet; Take 1 tablet (20 mg total) by mouth once daily. for 4 days  Dispense: 4 tablet; Refill: 0    Follow-up with PCP in 2 days.  ER if worse at any time.      Patient Instructions   1.  Take all medications as directed. Do not take muscle relaxants or narcotics (if prescribed) while driving as they can make you sleepy.  2.  Rest and keep yourself/patient well hydrated. For adults, it is recommended to drink at least 8-10 glasses of water daily.   3. Apply warm, moist heat to your sore muscles for 20 minutes at a time 3-5 times daily over the next several days.   4.  For patients above 6 months of age who are not allergic to and are not on anticoagulants, you can alternate Tylenol and Motrin every 4-6 hours for fever above 100.4F and/or pain.  For  patients less than 6 months of age, allergic to or intolerant to NSAIDS, have gastritis, gastric ulcers, or history of GI bleeds, are pregnant, or are on anticoagulant therapy, you can take Tylenol every 4 hours as needed for fever above 100.4F and/or pain.   5. You should schedule a follow-up appointment with your Primary Care Provider/Pediatrician for recheck in 2-3 days or as directed at this visit.   6.  If your condition fails to improve in a timely manner, you should receive another evaluation by your Primary Care Provider/Pediatrician to discuss your concerns or return to urgent care for a recheck.  If your condition worsens at any time, you should report immediately to your nearest Emergency Department for further evaluation. **You must understand that you have received Urgent Care treatment only and that you may be released before all of your medical problems are known or treated. You, the patient, are responsible to arrange for follow-up care as instructed.           Back Pain (Acute or Chronic)    Back pain is one of the most common problems. The good news is that most people feel better in 1 to 2 weeks, and most of the rest in 1 to 2 months. Most people can remain active.  People experience and describe pain differently; not everyone is the same.  · The pain can be sharp, stabbing, shooting, aching, cramping or burning.  · Movement, standing, bending, lifting, sitting, or walking may worsen pain.  · It can be localized to one spot or area, or it can be more generalized.  · It can spread or radiate upwards, to the front, or go down your arms or legs (sciatica).  · It can cause muscle spasm.  Most of the time, mechanical problems with the muscles or spine cause the pain. Mechanical problems are usually caused by an injury to the muscles or ligaments. While illness can cause back pain, it is usually not caused by a serious illness. Mechanical problems include:   · Physical activity such as sports, exercise,  work, or normal activity  · Overexertion, lifting, pushing, pulling incorrectly or too aggressively  · Sudden twisting, bending, or stretching from an accident, or accidental movement  · Poor posture  · Stretching or moving wrong, without noticing pain at the time  · Poor coordination, lack of regular exercise (check with your doctor about this)  · Spinal disc disease or arthritis  · Stress  Pain can also be related to pregnancy, or illness like appendicitis, bladder or kidney infections, pelvic infections, and many other things.  Acute back pain usually gets better in 1 to 2 weeks. Back pain related to disk disease, arthritis in the spinal joints or spinal stenosis (narrowing of the spinal canal) can become chronic and last for months or years.  Unless you had a physical injury (for example, a car accident or fall) X-rays are usually not needed for the initial evaluation of back pain. If pain continues and does not respond to medical treatment, X-rays and other tests may be needed.  Home care  Try these home care recommendations:  · When in bed, try to find a position of comfort. A firm mattress is best. Try lying flat on your back with pillows under your knees. You can also try lying on your side with your knees bent up towards your chest and a pillow between your knees.  · At first, do not try to stretch out the sore spots. If there is a strain, it is not like the good soreness you get after exercising without an injury. In this case, stretching may make it worse.  · Avoid prolong sitting, long car rides, or travel. This puts more stress on the lower back than standing or walking.  · During the first 24 to 72 hours after an acute injury or flare up of chronic back pain, apply an ice pack to the painful area for 20 minutes and then remove it for 20 minutes. Do this over a period of 60 to 90 minutes or several times a day. This will reduce swelling and pain. Wrap the ice pack in a thin towel or plastic to protect  your skin.  · You can start with ice, then switch to heat. Heat (hot shower, hot bath, or heating pad) reduces pain and works well for muscle spasms. Heat can be applied to the painful area for 20 minutes then remove it for 20 minutes. Do this over a period of 60 to 90 minutes or several times a day. Do not sleep on a heating pad. It can lead to skin burns or tissue damage.  · You can alternate ice and heat therapy. Talk with your doctor about the best treatment for your back pain.  · Therapeutic massage can help relax the back muscles without stretching them.  · Be aware of safe lifting methods and do not lift anything without stretching first.  Medicines  Talk to your doctor before using medicine, especially if you have other medical problems or are taking other medicines.  · You may use over-the-counter medicine as directed on the bottle to control pain, unless another pain medicine was prescribed. If you have chronic conditions like diabetes, liver or kidney disease, stomach ulcers, or gastrointestinal bleeding, or are taking blood thinners, talk to your doctor before taking any medicine.  · Be careful if you are given a prescription medicines, narcotics, or medicine for muscle spasms. They can cause drowsiness, affect your coordination, reflexes, and judgement. Do not drive or operate heavy machinery.  Follow-up care  Follow up with your healthcare provider, or as advised.   A radiologist will review any X-rays that were taken. Your provide will notify you of any new findings that may affect your care.  Call 911  Call emergency services if any of the following occur:  · Trouble breathing  · Confusion  · Very drowsy or trouble awakening  · Fainting or loss of consciousness  · Rapid or very slow heart rate  · Loss of bowel or bladder control  When to seek medical advice  Call your healthcare provider right away if any of these occur:   · Pain becomes worse or spreads to your legs  · Weakness or numbness in one or  both legs  · Numbness in the groin or genital area  Date Last Reviewed: 7/1/2016  © 3570-4746 [employer]. [address] [place] [address], [place] [address]. All rights reserved. This information is not intended as a substitute for professional medical care. Always follow your healthcare professional's instructions.

## 2020-01-08 NOTE — PATIENT INSTRUCTIONS
1.  Take all medications as directed. Do not take muscle relaxants or narcotics (if prescribed) while driving as they can make you sleepy.  2.  Rest and keep yourself/patient well hydrated. For adults, it is recommended to drink at least 8-10 glasses of water daily.   3. Apply warm, moist heat to your sore muscles for 20 minutes at a time 3-5 times daily over the next several days.   4.  For patients above 6 months of age who are not allergic to and are not on anticoagulants, you can alternate Tylenol and Motrin every 4-6 hours for fever above 100.4F and/or pain.  For patients less than 6 months of age, allergic to or intolerant to NSAIDS, have gastritis, gastric ulcers, or history of GI bleeds, are pregnant, or are on anticoagulant therapy, you can take Tylenol every 4 hours as needed for fever above 100.4F and/or pain.   5. You should schedule a follow-up appointment with your Primary Care Provider/Pediatrician for recheck in 2-3 days or as directed at this visit.   6.  If your condition fails to improve in a timely manner, you should receive another evaluation by your Primary Care Provider/Pediatrician to discuss your concerns or return to urgent care for a recheck.  If your condition worsens at any time, you should report immediately to your nearest Emergency Department for further evaluation. **You must understand that you have received Urgent Care treatment only and that you may be released before all of your medical problems are known or treated. You, the patient, are responsible to arrange for follow-up care as instructed.           Back Pain (Acute or Chronic)    Back pain is one of the most common problems. The good news is that most people feel better in 1 to 2 weeks, and most of the rest in 1 to 2 months. Most people can remain active.  People experience and describe pain differently; not everyone is the same.  · The pain can be sharp, stabbing, shooting, aching, cramping or burning.  · Movement,  standing, bending, lifting, sitting, or walking may worsen pain.  · It can be localized to one spot or area, or it can be more generalized.  · It can spread or radiate upwards, to the front, or go down your arms or legs (sciatica).  · It can cause muscle spasm.  Most of the time, mechanical problems with the muscles or spine cause the pain. Mechanical problems are usually caused by an injury to the muscles or ligaments. While illness can cause back pain, it is usually not caused by a serious illness. Mechanical problems include:   · Physical activity such as sports, exercise, work, or normal activity  · Overexertion, lifting, pushing, pulling incorrectly or too aggressively  · Sudden twisting, bending, or stretching from an accident, or accidental movement  · Poor posture  · Stretching or moving wrong, without noticing pain at the time  · Poor coordination, lack of regular exercise (check with your doctor about this)  · Spinal disc disease or arthritis  · Stress  Pain can also be related to pregnancy, or illness like appendicitis, bladder or kidney infections, pelvic infections, and many other things.  Acute back pain usually gets better in 1 to 2 weeks. Back pain related to disk disease, arthritis in the spinal joints or spinal stenosis (narrowing of the spinal canal) can become chronic and last for months or years.  Unless you had a physical injury (for example, a car accident or fall) X-rays are usually not needed for the initial evaluation of back pain. If pain continues and does not respond to medical treatment, X-rays and other tests may be needed.  Home care  Try these home care recommendations:  · When in bed, try to find a position of comfort. A firm mattress is best. Try lying flat on your back with pillows under your knees. You can also try lying on your side with your knees bent up towards your chest and a pillow between your knees.  · At first, do not try to stretch out the sore spots. If there is a  strain, it is not like the good soreness you get after exercising without an injury. In this case, stretching may make it worse.  · Avoid prolong sitting, long car rides, or travel. This puts more stress on the lower back than standing or walking.  · During the first 24 to 72 hours after an acute injury or flare up of chronic back pain, apply an ice pack to the painful area for 20 minutes and then remove it for 20 minutes. Do this over a period of 60 to 90 minutes or several times a day. This will reduce swelling and pain. Wrap the ice pack in a thin towel or plastic to protect your skin.  · You can start with ice, then switch to heat. Heat (hot shower, hot bath, or heating pad) reduces pain and works well for muscle spasms. Heat can be applied to the painful area for 20 minutes then remove it for 20 minutes. Do this over a period of 60 to 90 minutes or several times a day. Do not sleep on a heating pad. It can lead to skin burns or tissue damage.  · You can alternate ice and heat therapy. Talk with your doctor about the best treatment for your back pain.  · Therapeutic massage can help relax the back muscles without stretching them.  · Be aware of safe lifting methods and do not lift anything without stretching first.  Medicines  Talk to your doctor before using medicine, especially if you have other medical problems or are taking other medicines.  · You may use over-the-counter medicine as directed on the bottle to control pain, unless another pain medicine was prescribed. If you have chronic conditions like diabetes, liver or kidney disease, stomach ulcers, or gastrointestinal bleeding, or are taking blood thinners, talk to your doctor before taking any medicine.  · Be careful if you are given a prescription medicines, narcotics, or medicine for muscle spasms. They can cause drowsiness, affect your coordination, reflexes, and judgement. Do not drive or operate heavy machinery.  Follow-up care  Follow up with your  healthcare provider, or as advised.   A radiologist will review any X-rays that were taken. Your provide will notify you of any new findings that may affect your care.  Call 911  Call emergency services if any of the following occur:  · Trouble breathing  · Confusion  · Very drowsy or trouble awakening  · Fainting or loss of consciousness  · Rapid or very slow heart rate  · Loss of bowel or bladder control  When to seek medical advice  Call your healthcare provider right away if any of these occur:   · Pain becomes worse or spreads to your legs  · Weakness or numbness in one or both legs  · Numbness in the groin or genital area  Date Last Reviewed: 7/1/2016  © 4313-0355 [employer]. [address] [place] [address], [place] [address]. All rights reserved. This information is not intended as a substitute for professional medical care. Always follow your healthcare professional's instructions.

## 2020-01-27 PROBLEM — M17.0 BILATERAL PRIMARY OSTEOARTHRITIS OF KNEE: Status: ACTIVE | Noted: 2020-01-27

## 2020-12-04 ENCOUNTER — OFFICE VISIT (OUTPATIENT)
Dept: URGENT CARE | Facility: CLINIC | Age: 55
End: 2020-12-04
Payer: MEDICAID

## 2020-12-04 VITALS
WEIGHT: 270 LBS | SYSTOLIC BLOOD PRESSURE: 126 MMHG | RESPIRATION RATE: 16 BRPM | TEMPERATURE: 97 F | HEART RATE: 70 BPM | DIASTOLIC BLOOD PRESSURE: 85 MMHG | HEIGHT: 64 IN | BODY MASS INDEX: 46.1 KG/M2 | OXYGEN SATURATION: 100 %

## 2020-12-04 DIAGNOSIS — M72.2 PLANTAR FASCIITIS, LEFT: Primary | ICD-10-CM

## 2020-12-04 DIAGNOSIS — R09.81 NASAL CONGESTION: ICD-10-CM

## 2020-12-04 LAB
CTP QC/QA: YES
SARS-COV-2 RDRP RESP QL NAA+PROBE: NEGATIVE

## 2020-12-04 PROCEDURE — U0002 COVID-19 LAB TEST NON-CDC: HCPCS | Mod: QW,S$GLB,, | Performed by: NURSE PRACTITIONER

## 2020-12-04 PROCEDURE — U0002: ICD-10-PCS | Mod: QW,S$GLB,, | Performed by: NURSE PRACTITIONER

## 2020-12-04 PROCEDURE — 99214 PR OFFICE/OUTPT VISIT, EST, LEVL IV, 30-39 MIN: ICD-10-PCS | Mod: S$GLB,,, | Performed by: NURSE PRACTITIONER

## 2020-12-04 PROCEDURE — 99214 OFFICE O/P EST MOD 30 MIN: CPT | Mod: S$GLB,,, | Performed by: NURSE PRACTITIONER

## 2020-12-04 RX ORDER — AZELASTINE 1 MG/ML
1 SPRAY, METERED NASAL 2 TIMES DAILY
Qty: 30 ML | Refills: 0 | Status: SHIPPED | OUTPATIENT
Start: 2020-12-04

## 2020-12-04 NOTE — PROGRESS NOTES
"Subjective:       Patient ID: Carmina Amador is a 55 y.o. female.    Vitals:  height is 5' 4" (1.626 m) and weight is 122.5 kg (270 lb). Her temperature is 97.4 °F (36.3 °C). Her blood pressure is 126/85 and her pulse is 70. Her respiration is 16 and oxygen saturation is 100%.     Chief Complaint: Foot Pain and Sinus Problem    55 year old female reports left foot pain. Pt denies injury stating she wears soles inside of her shoe to help with the pain and she sees a podiatrist but had not been able to. She also reports sinus congestion and would like a covid test to be safe.     Foot Pain  This is a recurrent problem. The current episode started in the past 7 days. The problem occurs constantly. The problem has been unchanged. Associated symptoms include congestion. Pertinent negatives include no chills, coughing, diaphoresis, fatigue, fever, myalgias, nausea, rash, sore throat or vomiting. The symptoms are aggravated by walking. She has tried nothing for the symptoms.   Sinus Problem  This is a new problem. The current episode started in the past 7 days. The problem is unchanged. There has been no fever. Her pain is at a severity of 1/10. Associated symptoms include congestion. Pertinent negatives include no chills, coughing, diaphoresis, ear pain, shortness of breath, sinus pressure or sore throat. Past treatments include antibiotics.       Constitution: Negative for chills, sweating, fatigue and fever.   HENT: Positive for congestion. Negative for ear pain, sinus pain, sinus pressure, sore throat and voice change.    Neck: Negative for painful lymph nodes.   Eyes: Negative for eye redness.   Respiratory: Negative for chest tightness, cough, sputum production, bloody sputum, COPD, shortness of breath, stridor, wheezing and asthma.    Gastrointestinal: Negative for nausea and vomiting.   Musculoskeletal: Positive for pain (left foot). Negative for muscle ache.   Skin: Negative for rash.   Allergic/Immunologic: " Negative for seasonal allergies and asthma.   Hematologic/Lymphatic: Negative for swollen lymph nodes.       Objective:      Physical Exam   Constitutional: She is oriented to person, place, and time. She appears well-developed. She is cooperative.  Non-toxic appearance. She does not appear ill. No distress.   HENT:   Head: Normocephalic and atraumatic.   Ears:   Right Ear: Hearing, tympanic membrane, external ear and ear canal normal.   Left Ear: Hearing, tympanic membrane, external ear and ear canal normal.   Nose: Congestion present. No mucosal edema, rhinorrhea or nasal deformity. No epistaxis. Right sinus exhibits no maxillary sinus tenderness and no frontal sinus tenderness. Left sinus exhibits no maxillary sinus tenderness and no frontal sinus tenderness.   Mouth/Throat: Uvula is midline, oropharynx is clear and moist and mucous membranes are normal. No trismus in the jaw. Normal dentition. No uvula swelling. No oropharyngeal exudate, posterior oropharyngeal edema or posterior oropharyngeal erythema.   Eyes: Conjunctivae and lids are normal. No scleral icterus.   Neck: Trachea normal, full passive range of motion without pain and phonation normal. Neck supple. No neck rigidity. No edema and no erythema present.   Cardiovascular: Normal rate, regular rhythm, normal heart sounds and normal pulses.   Pulmonary/Chest: Effort normal and breath sounds normal. No stridor. No respiratory distress. She has no decreased breath sounds. She has no wheezes. She has no rhonchi. She has no rales. She exhibits no tenderness.   Abdominal: Normal appearance.   Musculoskeletal: Normal range of motion.         General: No deformity.        Feet:    Lymphadenopathy:     She has no cervical adenopathy.   Neurological: She is alert and oriented to person, place, and time. She exhibits normal muscle tone. Coordination normal.   Skin: Skin is warm, dry, intact, not diaphoretic and not pale. Psychiatric: Her speech is normal and  behavior is normal. Judgment and thought content normal.   Nursing note and vitals reviewed.        Assessment:       1. Plantar fasciitis, left    2. Nasal congestion        Plan:         Plantar fasciitis, left    Nasal congestion  -     POCT COVID-19 Rapid Screening  -     azelastine (ASTELIN) 137 mcg (0.1 %) nasal spray; 1 spray (137 mcg total) by Nasal route 2 (two) times daily.  Dispense: 30 mL; Refill: 0      Results for orders placed or performed in visit on 12/04/20   POCT COVID-19 Rapid Screening   Result Value Ref Range    POC Rapid COVID Negative Negative     Acceptable Yes      Patient Instructions   Contact your podiatrist to schedule an appointment as soon as possible. You may use the ibuprofen you stated you have at home for the left foot pain.     1.  Take all medications as directed. If you have been prescribed antibiotics, make sure to complete them.   2.  Rest and keep yourself/patient well hydrated. For adults, it is recommended to drink at least 8-10 glasses of water daily.   3.  For patients above 6 months of age who are not allergic to and are not on anticoagulants, you can alternate Tylenol and Motrin every 4-6 hours for fever above 100.4F and/or pain.  For patients less than 6 months of age, allergic to or intolerant to NSAIDS, have gastritis, gastric ulcers, or history of GI bleeds, are pregnant, or are on anticoagulant therapy, you can take Tylenol every 4 hours as needed for fever above 100.4F and/or pain.   4. You should schedule a follow-up appointment with your Primary Care Provider/Pediatrician for recheck in 2-3 days or as directed at this visit.   5.  If your condition fails to improve in a timely manner, you should receive another evaluation by your Primary Care Provider/Pediatrician to discuss your concerns or return to urgent care for a recheck.  If your condition worsens at any time, you should report immediately to your nearest Emergency Department for further  evaluation. **You must understand that you have received Urgent Care treatment only and that you may be released before all of your medical problems are known or treated. You, the patient, are responsible to arrange for follow-up care as instructed.         Plantar Fasciitis  Plantar fasciitis is a painful swelling of the plantar fascia. The plantar fascia is a thick, fibrous layer of tissue. It covers the bones on the bottom of your foot. And it supports the foot bones in an arched position.  This can happen gradually or suddenly. It usually affects one foot at a time. Heel pain can be sharp, like a knife sticking into the bottom of your foot. You may feel pain after exercising, long-distance jogging, stair climbing, long periods of standing, or after standing up.  Risk factors include: non-active lifestyle, arthritis, diabetes, obesity or recent weight gain, flat foot, high arch. Wearing high heels, loose shoes, or shoes with poor arch support for long periods of time adds to the risk. This problem is commonly found in runners and dancers. It also found in people who stand on hard surfaces for long periods of time.  Foot pain from this condition is usually worse in the morning. But it improves with walking. By the end of the day there may be a dull aching. Treatment requires short-term rest and controlling swelling. It may take up to 9 months before all symptoms go away. Rarely, a steroid injection into the foot, or surgery, may be needed.  Home care  · If you are overweight, lose weight to help healing.  · Choose supportive shoes with good arch support and shock absorbency. Replace athletic shoes when they become worn out. Dont walk or run barefoot.  · Premade or custom-fitted shoe inserts may be helpful. Inserts made of silicone seem to be the most effective. Custom-made inserts can be provided by a podiatrist or foot specialist, physical therapist, or orthopedist.  · Premade or custom-made night splints keep  the heel stretched out while you sleep. They may prevent morning pain.  · Avoid activities that stress the feet: jogging, prolonged standing or walking, contact sports, etc.  · First thing in the morning and before sports, stretch the bottom of your feet. Gently flex your ankle so the toes move toward your knee.  · Icing may help control heel pain. Apply an ice pack to the heel for 10-20 minutes as a preventive. Or ice your heel after a severe flare-up of symptoms. You may repeat this every 1-2 hours as needed.  · You may use over-the-counter pain medicine to control pain, unless another medicine was prescribed. Anti-inflammatory pain medicines, such as ibuprofen or naproxen, may work better than acetaminophen. If you have chronic liver or kidney disease or ever had a stomach ulcer or GI bleeding, talk with your healthcare provider before using these medicines.  Follow-up care  Follow up with your healthcare provider, physical therapist, or podiatrist or foot specialist as advised.  Call for an appointment if pain worsens or there is no relief after a few weeks of home treatment. Shoe inserts, a night splint, or a special boot may be required.  If X-rays were taken, you will be told of any new findings that may affect your care.  When to seek medical advice  Call your healthcare provider right away if any of these occur:  · Foot swelling  · Redness with increasing pain  Date Last Reviewed: 11/21/2015  © 9421-7854 [employer]. [address] [place] [address], [place] [address]. All rights reserved. This information is not intended as a substitute for professional medical care. Always follow your healthcare professional's instructions.

## 2020-12-04 NOTE — PATIENT INSTRUCTIONS
Contact your podiatrist to schedule an appointment as soon as possible. You may use the ibuprofen you stated you have at home for the left foot pain.     1.  Take all medications as directed. If you have been prescribed antibiotics, make sure to complete them.   2.  Rest and keep yourself/patient well hydrated. For adults, it is recommended to drink at least 8-10 glasses of water daily.   3.  For patients above 6 months of age who are not allergic to and are not on anticoagulants, you can alternate Tylenol and Motrin every 4-6 hours for fever above 100.4F and/or pain.  For patients less than 6 months of age, allergic to or intolerant to NSAIDS, have gastritis, gastric ulcers, or history of GI bleeds, are pregnant, or are on anticoagulant therapy, you can take Tylenol every 4 hours as needed for fever above 100.4F and/or pain.   4. You should schedule a follow-up appointment with your Primary Care Provider/Pediatrician for recheck in 2-3 days or as directed at this visit.   5.  If your condition fails to improve in a timely manner, you should receive another evaluation by your Primary Care Provider/Pediatrician to discuss your concerns or return to urgent care for a recheck.  If your condition worsens at any time, you should report immediately to your nearest Emergency Department for further evaluation. **You must understand that you have received Urgent Care treatment only and that you may be released before all of your medical problems are known or treated. You, the patient, are responsible to arrange for follow-up care as instructed.         Plantar Fasciitis  Plantar fasciitis is a painful swelling of the plantar fascia. The plantar fascia is a thick, fibrous layer of tissue. It covers the bones on the bottom of your foot. And it supports the foot bones in an arched position.  This can happen gradually or suddenly. It usually affects one foot at a time. Heel pain can be sharp, like a knife sticking into the  bottom of your foot. You may feel pain after exercising, long-distance jogging, stair climbing, long periods of standing, or after standing up.  Risk factors include: non-active lifestyle, arthritis, diabetes, obesity or recent weight gain, flat foot, high arch. Wearing high heels, loose shoes, or shoes with poor arch support for long periods of time adds to the risk. This problem is commonly found in runners and dancers. It also found in people who stand on hard surfaces for long periods of time.  Foot pain from this condition is usually worse in the morning. But it improves with walking. By the end of the day there may be a dull aching. Treatment requires short-term rest and controlling swelling. It may take up to 9 months before all symptoms go away. Rarely, a steroid injection into the foot, or surgery, may be needed.  Home care  · If you are overweight, lose weight to help healing.  · Choose supportive shoes with good arch support and shock absorbency. Replace athletic shoes when they become worn out. Dont walk or run barefoot.  · Premade or custom-fitted shoe inserts may be helpful. Inserts made of silicone seem to be the most effective. Custom-made inserts can be provided by a podiatrist or foot specialist, physical therapist, or orthopedist.  · Premade or custom-made night splints keep the heel stretched out while you sleep. They may prevent morning pain.  · Avoid activities that stress the feet: jogging, prolonged standing or walking, contact sports, etc.  · First thing in the morning and before sports, stretch the bottom of your feet. Gently flex your ankle so the toes move toward your knee.  · Icing may help control heel pain. Apply an ice pack to the heel for 10-20 minutes as a preventive. Or ice your heel after a severe flare-up of symptoms. You may repeat this every 1-2 hours as needed.  · You may use over-the-counter pain medicine to control pain, unless another medicine was prescribed.  Anti-inflammatory pain medicines, such as ibuprofen or naproxen, may work better than acetaminophen. If you have chronic liver or kidney disease or ever had a stomach ulcer or GI bleeding, talk with your healthcare provider before using these medicines.  Follow-up care  Follow up with your healthcare provider, physical therapist, or podiatrist or foot specialist as advised.  Call for an appointment if pain worsens or there is no relief after a few weeks of home treatment. Shoe inserts, a night splint, or a special boot may be required.  If X-rays were taken, you will be told of any new findings that may affect your care.  When to seek medical advice  Call your healthcare provider right away if any of these occur:  · Foot swelling  · Redness with increasing pain  Date Last Reviewed: 11/21/2015  © 4065-2198 [employer]. [address] [place] [address], [place] [address]. All rights reserved. This information is not intended as a substitute for professional medical care. Always follow your healthcare professional's instructions.

## 2022-12-09 ENCOUNTER — OFFICE VISIT (OUTPATIENT)
Dept: URGENT CARE | Facility: CLINIC | Age: 57
End: 2022-12-09
Payer: MEDICAID

## 2022-12-09 VITALS
DIASTOLIC BLOOD PRESSURE: 91 MMHG | TEMPERATURE: 98 F | WEIGHT: 282 LBS | RESPIRATION RATE: 16 BRPM | OXYGEN SATURATION: 98 % | HEART RATE: 63 BPM | BODY MASS INDEX: 48.41 KG/M2 | SYSTOLIC BLOOD PRESSURE: 170 MMHG

## 2022-12-09 DIAGNOSIS — J32.9 SINUSITIS, UNSPECIFIED CHRONICITY, UNSPECIFIED LOCATION: Primary | ICD-10-CM

## 2022-12-09 PROCEDURE — 1159F MED LIST DOCD IN RCRD: CPT | Mod: CPTII,S$GLB,, | Performed by: NURSE PRACTITIONER

## 2022-12-09 PROCEDURE — 3077F SYST BP >= 140 MM HG: CPT | Mod: CPTII,S$GLB,, | Performed by: NURSE PRACTITIONER

## 2022-12-09 PROCEDURE — 3080F DIAST BP >= 90 MM HG: CPT | Mod: CPTII,S$GLB,, | Performed by: NURSE PRACTITIONER

## 2022-12-09 PROCEDURE — 99214 PR OFFICE/OUTPT VISIT, EST, LEVL IV, 30-39 MIN: ICD-10-PCS | Mod: S$GLB,,, | Performed by: NURSE PRACTITIONER

## 2022-12-09 PROCEDURE — 4010F PR ACE/ARB THEARPY RXD/TAKEN: ICD-10-PCS | Mod: CPTII,S$GLB,, | Performed by: NURSE PRACTITIONER

## 2022-12-09 PROCEDURE — 99214 OFFICE O/P EST MOD 30 MIN: CPT | Mod: S$GLB,,, | Performed by: NURSE PRACTITIONER

## 2022-12-09 PROCEDURE — 3080F PR MOST RECENT DIASTOLIC BLOOD PRESSURE >= 90 MM HG: ICD-10-PCS | Mod: CPTII,S$GLB,, | Performed by: NURSE PRACTITIONER

## 2022-12-09 PROCEDURE — 3008F PR BODY MASS INDEX (BMI) DOCUMENTED: ICD-10-PCS | Mod: CPTII,S$GLB,, | Performed by: NURSE PRACTITIONER

## 2022-12-09 PROCEDURE — 1159F PR MEDICATION LIST DOCUMENTED IN MEDICAL RECORD: ICD-10-PCS | Mod: CPTII,S$GLB,, | Performed by: NURSE PRACTITIONER

## 2022-12-09 PROCEDURE — 3008F BODY MASS INDEX DOCD: CPT | Mod: CPTII,S$GLB,, | Performed by: NURSE PRACTITIONER

## 2022-12-09 PROCEDURE — 3077F PR MOST RECENT SYSTOLIC BLOOD PRESSURE >= 140 MM HG: ICD-10-PCS | Mod: CPTII,S$GLB,, | Performed by: NURSE PRACTITIONER

## 2022-12-09 PROCEDURE — 4010F ACE/ARB THERAPY RXD/TAKEN: CPT | Mod: CPTII,S$GLB,, | Performed by: NURSE PRACTITIONER

## 2022-12-09 RX ORDER — AZITHROMYCIN 250 MG/1
TABLET, FILM COATED ORAL
Qty: 6 TABLET | Refills: 0 | Status: SHIPPED | OUTPATIENT
Start: 2022-12-09 | End: 2022-12-14

## 2022-12-09 RX ORDER — BENZONATATE 100 MG/1
100 CAPSULE ORAL 3 TIMES DAILY PRN
Qty: 15 CAPSULE | Refills: 1 | Status: SHIPPED | OUTPATIENT
Start: 2022-12-09 | End: 2022-12-13

## 2022-12-10 NOTE — PROGRESS NOTES
Subjective:       Patient ID: Carmina Amador is a 57 y.o. female.    Vitals:  weight is 127.9 kg (282 lb). Her tympanic temperature is 97.7 °F (36.5 °C). Her blood pressure is 170/91 (abnormal) and her pulse is 63. Her respiration is 16 and oxygen saturation is 98%.     Chief Complaint: Sinus Problem    Sinus Problem  This is a new problem. Episode onset: 2 weeks. The problem has been gradually worsening since onset. There has been no fever. Her pain is at a severity of 0/10. She is experiencing no pain. Associated symptoms include congestion (runny nose), coughing and neck pain. Pertinent negatives include no ear pain, headaches, sinus pressure or sore throat. Treatments tried: cold and flu, tessalon. The treatment provided mild relief.     HENT:  Positive for congestion (runny nose). Negative for ear pain, sinus pressure and sore throat.    Neck: Positive for neck pain.   Respiratory:  Positive for cough.    Neurological:  Negative for headaches.     Objective:      Physical Exam   Constitutional: normal  HENT:   Head: Normocephalic.   Ears:   Right Ear: Tympanic membrane normal.   Left Ear: Tympanic membrane normal.   Nose: Congestion present. Right sinus exhibits maxillary sinus tenderness. Left sinus exhibits maxillary sinus tenderness.   Mouth/Throat: Mucous membranes are moist.   Cardiovascular: Normal rate.   Pulmonary/Chest: Effort normal. No stridor. No respiratory distress. She has no wheezes. She has no rales.   Abdominal: Normal appearance and bowel sounds are normal.   Neurological: She is alert.   Skin: Capillary refill takes less than 2 seconds.   Nursing note and vitals reviewed.      Assessment:       1. Sinusitis, unspecified chronicity, unspecified location          Plan:         Sinusitis, unspecified chronicity, unspecified location    Other orders  -     azithromycin (Z-GILLES) 250 MG tablet; Take 2 tablets by mouth on day 1; Take 1 tablet by mouth on days 2-5  Dispense: 6 tablet; Refill: 0  -      benzonatate (TESSALON PERLES) 100 MG capsule; Take 1 capsule (100 mg total) by mouth 3 (three) times daily as needed for Cough.  Dispense: 15 capsule; Refill: 1          1.  Take all antibiotics as prescribed.  It is imperative that once you start them, you take them to completion.     2.  For patients above 6 months of age who are not allergic to and are not on anticoagulants, you can alternate Tylenol and Motrin every 4-6 hours for fever above 100.4F and/or pain.  For patients less than 6 months of age, allergic to or intolerant to NSAIDS, have gastritis, gastric ulcers, or history of GI bleeds, are pregnant, or are on anticoagulant therapy, you can take Tylenol every 4 hours as needed for fever above 100.4F and/or pain.     3.  Rest and keep yourself well hydrated.  Drink hot liquids (coffee, water, tea, hot chocolate, or soup) 10-12 times a day for 5-7 days.  Put liquid in a mug and place in microwave for 2.5 - 3 minutes. Pour hot liquid into another mug not used to microwave the liquid (to avoid burning your mouth) then sniff the steam from the cup and sip the heated liquid.    4.  You can use these over the counter medications/remedies to help with your symptoms:     Runny Nose:  Use an antihistamine such as Claritin, Zyrtec or Allegra to help dry you out.     Congestion:  Use pseudoephedrine (behind the counter) for congestion- Pseudoephedrine 30 mg up to 240 mg /day. Warning:  It can raise your blood pressure and give you palpitations.  Coricidin HBP is okay to use if you have high blood pressure.     Use mucinex (guaifenisin) up to 2400mg/day to break up/loosen any mucous. MucinexDM has a cough suppressant that can be used for cough and at night to stop the tickle in the back of your throat.    Use Nasal Saline to mechanically move any post nasal drip from your eustachian tubes or from the back of your throat.    Use Afrin in each nare, for no longer than 3 days, as it is addictive. It can also dry out  your mucous membranes and cause elevated blood pressure.    Use Flonase 1-2 sprays/nostril per day. It is a local acting steroid nasal spray.  If you develop a bloody nose, stop using the medication immediately.    Sore throat:  Use warm, salt water gargles to ease your throat pain- 1/2 tsp salt to 1 cup warm water, gargle as desired.  Chloraseptic sprays and throat lozenges will also help to ease throat pain.     Sometimes Nyquil at night is beneficial to help you get some rest; however, it is sedating and does contain an antihistamine and Tylenol.  Make sure not to double up on these medications.      These things will help you to feel better and will speed your recovery.  If your condition fails to improve in a timely manner, you should receive another evaluation by your Primary Care Provider/Pediatrician to discuss your concerns or return to urgent care for a recheck.  If your condition worsens at any time, you should report immediately to your nearest Emergency Department for further evaluation. **You must understand that you have received Urgent Care treatment only and that you may be released before all of your medical problems are known or treated. You, the patient, are responsible to arrange for follow-up care as instructed.

## 2023-07-11 ENCOUNTER — OFFICE VISIT (OUTPATIENT)
Dept: URGENT CARE | Facility: CLINIC | Age: 58
End: 2023-07-11
Payer: MEDICAID

## 2023-07-11 VITALS
SYSTOLIC BLOOD PRESSURE: 147 MMHG | WEIGHT: 275 LBS | HEIGHT: 64 IN | TEMPERATURE: 98 F | BODY MASS INDEX: 46.95 KG/M2 | OXYGEN SATURATION: 99 % | DIASTOLIC BLOOD PRESSURE: 88 MMHG | HEART RATE: 67 BPM | RESPIRATION RATE: 16 BRPM

## 2023-07-11 DIAGNOSIS — R10.9 FLANK PAIN: Primary | ICD-10-CM

## 2023-07-11 LAB
BILIRUB UR QL STRIP: NEGATIVE
GLUCOSE UR QL STRIP: NEGATIVE
KETONES UR QL STRIP: NEGATIVE
LEUKOCYTE ESTERASE UR QL STRIP: NEGATIVE
PH, POC UA: 5 (ref 5–8)
POC BLOOD, URINE: NEGATIVE
POC NITRATES, URINE: NEGATIVE
PROT UR QL STRIP: NEGATIVE
SP GR UR STRIP: 1.02 (ref 1–1.03)
UROBILINOGEN UR STRIP-ACNC: NORMAL (ref 0.1–1.1)

## 2023-07-11 PROCEDURE — 99214 OFFICE O/P EST MOD 30 MIN: CPT | Mod: S$GLB,,, | Performed by: NURSE PRACTITIONER

## 2023-07-11 PROCEDURE — 81003 POCT URINALYSIS, DIPSTICK, AUTOMATED, W/O SCOPE: ICD-10-PCS | Mod: QW,S$GLB,, | Performed by: NURSE PRACTITIONER

## 2023-07-11 PROCEDURE — 99214 PR OFFICE/OUTPT VISIT, EST, LEVL IV, 30-39 MIN: ICD-10-PCS | Mod: S$GLB,,, | Performed by: NURSE PRACTITIONER

## 2023-07-11 PROCEDURE — 81003 URINALYSIS AUTO W/O SCOPE: CPT | Mod: QW,S$GLB,, | Performed by: NURSE PRACTITIONER

## 2023-07-11 RX ORDER — VALSARTAN 160 MG/1
TABLET ORAL
COMMUNITY
Start: 2023-05-31

## 2023-07-11 RX ORDER — KETOROLAC TROMETHAMINE 30 MG/ML
30 INJECTION, SOLUTION INTRAMUSCULAR; INTRAVENOUS
Status: COMPLETED | OUTPATIENT
Start: 2023-07-11 | End: 2023-07-11

## 2023-07-11 RX ORDER — ASPIRIN 81 MG/1
81 TABLET ORAL DAILY
COMMUNITY

## 2023-07-11 RX ADMIN — KETOROLAC TROMETHAMINE 30 MG: 30 INJECTION, SOLUTION INTRAMUSCULAR; INTRAVENOUS at 10:07

## 2023-07-11 NOTE — PATIENT INSTRUCTIONS
Based on your visit today, the exact cause of your pain is not certain. Your condition does not seem serious now; however, the signs of a serious problem may take more time to appear. Therefore, it is important for you to watch for any new symptoms or worsening of your condition as we discussed in the clinic.    HOME CARE:  1. Rest until your next exam. No strenuous activities.  2. Eat a diet low in fiber (called a low-residue diet). Foods allowed include refined breads, white rice, fruit and vegetable juices without pulp, tender meats. These foods will pass more easily through the intestine.  3. Avoid whole-grain foods, whole fruits and vegetables, meats, seeds and nuts, fried or fatty foods, dairy, alcohol, and spicy foods until your symptoms go away.  4. Take all medications as directed.   5. Rest and keep yourself/patient well hydrated.        FOLLOW UP with your primary care physician in 1-2 days or as directed at this visit.      You should reports immediately to your nearest Emergency Department for further evaluation if your condition worsens at any time or any of the following occur:  Pain gets worse or moves to the right lower abdomen  New or worsening vomiting or diarrhea  Swelling of the abdomen  Unable to pass stool for more than three days  New fever over 100.0º F (37.8ºC), or rising fever  Blood in vomit or bowel movements (dark red or black color)  Jaundice (yellow color of eyes and skin)  Weakness, dizziness, or fainting  Chest, arm, back, neck or jaw pain  Unexpected vaginal bleeding or missed period    **You must understand that you have received Urgent Care treatment only and that you may be released before all of your medical problems are known or treated. You, the patient, are responsible to arrange for follow-up care as instructed.

## 2023-07-11 NOTE — PROGRESS NOTES
"Subjective:      Patient ID: Carmina Amador is a 57 y.o. female.    Vitals:  height is 5' 4" (1.626 m) and weight is 124.7 kg (275 lb). Her oral temperature is 97.6 °F (36.4 °C). Her blood pressure is 147/88 (abnormal) and her pulse is 67. Her respiration is 16 and oxygen saturation is 99%.     Chief Complaint: Back Pain    Pt here with c/o left lower back pain that radiates to LLQ. Pain is intermittent and comes on suddenly and associated with nausea. Denies fever, pain with urination, urgency or frequency. Pt states the pain is similar to when she had shingles.    Back Pain  This is a new problem. Episode onset: a couple of days. The problem occurs intermittently (no pain right now.). The problem has been waxing and waning since onset. Pain location: left lower back and comes around to the front. The quality of the pain is described as stabbing. The pain does not radiate. The pain is at a severity of 5/10. The pain is moderate. Exacerbated by: just comes and goes. Pertinent negatives include no bladder incontinence, dysuria, headaches, leg pain, numbness or tingling.     Genitourinary:  Negative for dysuria and bladder incontinence.   Musculoskeletal:  Positive for back pain.   Neurological:  Negative for headaches and numbness.    Objective:     Physical Exam   Constitutional: She is oriented to person, place, and time. She appears well-developed.  Non-toxic appearance. She does not appear ill. No distress.   HENT:   Head: Normocephalic and atraumatic.   Ears:   Right Ear: External ear normal.   Left Ear: External ear normal.   Nose: Nose normal.   Mouth/Throat: Mucous membranes are normal.   Eyes: Conjunctivae and lids are normal.   Neck: Trachea normal. Neck supple.   Cardiovascular: Normal rate, regular rhythm and normal heart sounds.   Pulmonary/Chest: Effort normal and breath sounds normal. No respiratory distress.   Abdominal: Normal appearance and bowel sounds are normal. She exhibits no distension and no " mass. Soft. There is no abdominal tenderness. There is no rebound, no guarding, no tenderness at McBurney's point, negative Abernathy's sign, no left CVA tenderness and no right CVA tenderness.      Comments: Area of subjective intermittent pain no tenderness on exam. NAD, speaking in complete sentences, able to get on and off exam table     Musculoskeletal: Normal range of motion.         General: Normal range of motion.        Back:    Neurological: She is alert and oriented to person, place, and time. She has normal strength.   Skin: Skin is warm, dry, intact, not diaphoretic and not pale.   Psychiatric: Her speech is normal and behavior is normal. Judgment and thought content normal.   Nursing note and vitals reviewed.    Assessment:     1. Flank pain      Results for orders placed or performed in visit on 07/11/23   POCT Urinalysis, Dipstick, Automated, W/O Scope   Result Value Ref Range    POC Blood, Urine Negative Negative    POC Bilirubin, Urine Negative Negative    POC Urobilinogen, Urine normal 0.1 - 1.1    POC Ketones, Urine Negative Negative    POC Protein, Urine Negative Negative    POC Nitrates, Urine Negative Negative    POC Glucose, Urine Negative Negative    pH, UA 5.0 5 - 8    POC Specific Gravity, Urine 1.025 1.003 - 1.029    POC Leukocytes, Urine Negative Negative      Plan:       Flank pain  -     POCT Urinalysis, Dipstick, Automated, W/O Scope  -     ketorolac injection 30 mg          Medical Decision Making:   History:   Old Medical Records: I decided to obtain old medical records.  Old Records Summarized: records from clinic visits and other records.       <> Summary of Records: I have reviewed the patients previous visits, labs and images to look for any trends or previous treatments.    Clinical Tests:   Lab Tests: Ordered and Reviewed  The following lab test(s) were unremarkable: Urinalysis     Patient Instructions   Based on your visit today, the exact cause of your pain is not certain. Your  condition does not seem serious now; however, the signs of a serious problem may take more time to appear. Therefore, it is important for you to watch for any new symptoms or worsening of your condition as we discussed in the clinic.    HOME CARE:  1. Rest until your next exam. No strenuous activities.  2. Eat a diet low in fiber (called a low-residue diet). Foods allowed include refined breads, white rice, fruit and vegetable juices without pulp, tender meats. These foods will pass more easily through the intestine.  3. Avoid whole-grain foods, whole fruits and vegetables, meats, seeds and nuts, fried or fatty foods, dairy, alcohol, and spicy foods until your symptoms go away.  4. Take all medications as directed.   5. Rest and keep yourself/patient well hydrated.        FOLLOW UP with your primary care physician in 1-2 days or as directed at this visit.      You should reports immediately to your nearest Emergency Department for further evaluation if your condition worsens at any time or any of the following occur:  Pain gets worse or moves to the right lower abdomen  New or worsening vomiting or diarrhea  Swelling of the abdomen  Unable to pass stool for more than three days  New fever over 100.0º F (37.8ºC), or rising fever  Blood in vomit or bowel movements (dark red or black color)  Jaundice (yellow color of eyes and skin)  Weakness, dizziness, or fainting  Chest, arm, back, neck or jaw pain  Unexpected vaginal bleeding or missed period    **You must understand that you have received Urgent Care treatment only and that you may be released before all of your medical problems are known or treated. You, the patient, are responsible to arrange for follow-up care as instructed.

## 2023-07-21 DIAGNOSIS — U07.1 COVID-19 VIRUS DETECTED: ICD-10-CM

## 2023-07-22 ENCOUNTER — NURSE TRIAGE (OUTPATIENT)
Dept: ADMINISTRATIVE | Facility: CLINIC | Age: 58
End: 2023-07-22
Payer: MEDICAID

## 2023-07-22 NOTE — TELEPHONE ENCOUNTER
"Pt called for c/o coughing up blood or blood streaked sputum. Pt was dx with covid yesterday. Pt denies any SOB or CP and triaged and care advice to go to the ED. Pt told to wear a mask and also to let them know that she is Covid +. Pt told to call back once home if any other questions or concerns. Pt said ok. Pt also c/o HA last night but not there this am. [facility]                 Reason for Disposition   History of prior "blood clot" in leg or lungs (i.e., deep vein thrombosis, pulmonary embolism)    Additional Information   Negative: SEVERE difficulty breathing (e.g., struggling for each breath, speaks in single words)   Negative: [1] Chest pain AND [2] difficulty breathing   Negative: Bluish (or gray) lips or face now   Negative: Passed out (i.e., lost consciousness, collapsed and was not responding)   Negative: Shock suspected (e.g., cold/pale/clammy skin, too weak to stand, low BP, rapid pulse)   Negative: Difficult to awaken or acting confused (e.g., disoriented, slurred speech)   Negative: Recent chest injury (i.e., past 24 hours)   Negative: [1] Coughed up blood AND [2] large amount (Such as: "a half cup of blood")   Negative: Sounds like a life-threatening emergency to the triager   Negative: [1] MODERATE difficulty breathing (e.g., speaks in phrases, SOB even at rest, pulse 100-120) AND [2] still present when not coughing   Negative: Chest pain   Negative: Unclear to triager if the patient is coughing up blood or vomiting blood    Protocols used: Coughing Up Blood-A-AH    "

## 2023-07-25 ENCOUNTER — OFFICE VISIT (OUTPATIENT)
Dept: URGENT CARE | Facility: CLINIC | Age: 58
End: 2023-07-25
Payer: MEDICAID

## 2023-07-25 VITALS
HEART RATE: 62 BPM | HEIGHT: 64 IN | RESPIRATION RATE: 20 BRPM | DIASTOLIC BLOOD PRESSURE: 83 MMHG | OXYGEN SATURATION: 97 % | TEMPERATURE: 98 F | SYSTOLIC BLOOD PRESSURE: 142 MMHG | BODY MASS INDEX: 46.1 KG/M2 | WEIGHT: 270 LBS

## 2023-07-25 DIAGNOSIS — U07.1 COVID-19: Primary | ICD-10-CM

## 2023-07-25 PROCEDURE — 99212 OFFICE O/P EST SF 10 MIN: CPT | Mod: S$GLB,,,

## 2023-07-25 PROCEDURE — 99212 PR OFFICE/OUTPT VISIT, EST, LEVL II, 10-19 MIN: ICD-10-PCS | Mod: S$GLB,,,

## 2023-07-25 RX ORDER — ATORVASTATIN CALCIUM 10 MG/1
TABLET, FILM COATED ORAL
COMMUNITY
Start: 2023-06-12

## 2023-07-25 NOTE — PROGRESS NOTES
"Subjective:      Patient ID: Carmina Amador is a 57 y.o. female.    Vitals:  height is 5' 4" (1.626 m) and weight is 122.5 kg (270 lb). Her oral temperature is 98.3 °F (36.8 °C). Her blood pressure is 142/83 (abnormal) and her pulse is 62. Her respiration is 20 and oxygen saturation is 97%.     Chief Complaint: Sinus Problem    Patient was tested + on the 21st for covid. She was trying to get retested to see if it would show negative.  She is feeling better.     Sinus Problem  This is a new problem. The current episode started today. The problem is unchanged. There has been no fever. Her pain is at a severity of 0/10. She is experiencing no pain. Associated symptoms include sinus pressure. Pertinent negatives include no chills, coughing or diaphoresis. Past treatments include nothing. The treatment provided no relief.     Constitution: Negative for activity change, appetite change, chills, sweating, fatigue, fever and unexpected weight change.   HENT:  Positive for sinus pressure.    Respiratory:  Negative for cough.    Allergic/Immunologic: Negative for environmental allergies and seasonal allergies.    Objective:     Physical Exam    Assessment:     No diagnosis found.    Plan:       There are no diagnoses linked to this encounter.    Pt came in today asking to have a second COVID test.  She went to the ER on 7/21/23 and was positive.  Educated patient on how it was too soon to test, but that also a test of cure was not necessary.  She states that her daughter had surgery yesterday for colon cancer and she wanted to visit her.  If she had a neg covid test she could go.  Educated pt that she is technically still in the quarantine window and this would not be recommended.  She then asked if she could take a zpack and would that protect her daughter.  Educated pt on the difference between virus and bacterial infections and how abx work.  She voiced understanding.       Symptoms to monitor for and education provided " to patient in person and recap of education provided in handout - AVS.      Discussed plan of care with patient.  They voiced full understanding and are in agreement with the current plan of care.  All known questions and concerns addressed at this time.       You must understand that you have received treatment at an Urgent Care Facility only, and that you may be released before all of your medical problems are known or treated.  Urgent Care facilities are not equipped to handle life threatening emergencies.  It is recommended that you seek care at an Emergency Department IF further evaluation or worsening or concerning symptoms occur, or possibly life threatening conditions as discussed.

## 2023-07-25 NOTE — PATIENT INSTRUCTIONS
"Per the CDC and [facility] guidelines, once your 10 days have passed, and you have not had fever greater than 100.4F in the last 24 hours without taking any fever reducers such as Tylenol (Acetaminophen) or Motrin (Ibuprofen), you may return to your normal activities including social distancing, wearing masks, and frequent handwashing - YOU DO NOT NEED ANOTHER TEST IN ORDER TO END YOUR QUARANTINE.    CDC COVID QUARANTINE     Quarantine    Quarantine if you have been in close contact (within 6 feet of someone for a cumulative total of 15 minutes or more over a 24-hour period) with someone who has COVID-19, unless you have been fully vaccinated or had a positive test within 90 days and are no longer symptomatic.  People who are fully vaccinated and/or had a positive test within 90 days do NOT need to quarantine after contact with someone who had COVID-19 unless they have symptoms. However, fully vaccinated people who have not had a positive test within 90 days, should get tested 3-5 days after their exposure, even if they dont have symptoms and wear a mask indoors in public for 14 days following exposure or until their test result is negative.      If you have not been vaccinated, and dont have a positive test within 90 days, your quarantine is 10 days without a test, or you can choose to test out of quarantine.     After 5-7 days without symptoms, you can test at that point and you can come out of quarantine on day 8 if negative and still without symptoms.     The risk is that if you test without symptoms on Day 6 (for example) and you are positive, your 10 day quarantine begins on that day, and you turned your 7 day quarantine into 16 days.    You have tested negative for COVID-19 today.  If you did not have a close exposure (as defined below) you can return to your normal daily activities to include social distancing, wearing a mask and frequent handwashing.    A "close exposure" is defined as anyone who has had an " exposure (masked or unmasked) to a known COVID -19 positive person within 6 ft for longer than 15 minutes. If your exposure meets this definition, you are required by CDC guidelines to quarantine for at least 5 days from time of exposure.  The CDC states that a test can be performed for an asymptomatic patient (someone who does not have any symptoms) after a close exposure, and that a test should be done if you develop symptoms after a close exposure as described above.  If you develop symptoms sooner, you should test when your symptoms start.  If you developed symptoms since the exposure, and your test was negative today and less than 5 days from your exposure, you still have to quarantine for 5 days from the date of the exposure.  The 5 day quarantine begins from the day you were exposed, not the day of your test.  For example, if your exposure was on a Monday, and you waited until Friday of the same week to get tested and it was negative, your 5 day quarantine begins from that Monday, not the Friday you tested negative.  Please note, if you decide to test as an asymptomatic during your quarantine and you are positive, you will be restarting your quarantine.

## 2023-09-28 ENCOUNTER — OFFICE VISIT (OUTPATIENT)
Dept: URGENT CARE | Facility: CLINIC | Age: 58
End: 2023-09-28
Payer: MEDICAID

## 2023-09-28 VITALS
RESPIRATION RATE: 20 BRPM | HEIGHT: 64 IN | WEIGHT: 270 LBS | TEMPERATURE: 99 F | SYSTOLIC BLOOD PRESSURE: 133 MMHG | HEART RATE: 81 BPM | DIASTOLIC BLOOD PRESSURE: 88 MMHG | OXYGEN SATURATION: 95 % | BODY MASS INDEX: 46.1 KG/M2

## 2023-09-28 DIAGNOSIS — R19.7 DIARRHEA, UNSPECIFIED TYPE: Primary | ICD-10-CM

## 2023-09-28 DIAGNOSIS — M25.562 PAIN IN BOTH KNEES, UNSPECIFIED CHRONICITY: ICD-10-CM

## 2023-09-28 DIAGNOSIS — M25.561 PAIN IN BOTH KNEES, UNSPECIFIED CHRONICITY: ICD-10-CM

## 2023-09-28 PROCEDURE — 99214 OFFICE O/P EST MOD 30 MIN: CPT | Mod: S$GLB,,, | Performed by: NURSE PRACTITIONER

## 2023-09-28 PROCEDURE — 99214 PR OFFICE/OUTPT VISIT, EST, LEVL IV, 30-39 MIN: ICD-10-PCS | Mod: S$GLB,,, | Performed by: NURSE PRACTITIONER

## 2023-09-28 RX ORDER — KETOROLAC TROMETHAMINE 30 MG/ML
30 INJECTION, SOLUTION INTRAMUSCULAR; INTRAVENOUS
Status: COMPLETED | OUTPATIENT
Start: 2023-09-28 | End: 2023-09-28

## 2023-09-28 RX ADMIN — KETOROLAC TROMETHAMINE 30 MG: 30 INJECTION, SOLUTION INTRAMUSCULAR; INTRAVENOUS at 09:09

## 2023-09-28 NOTE — PATIENT INSTRUCTIONS
1.  Take all medications as directed (only as needed for your symptoms).  2.  Rest and keep yourself/patient well hydrated. Start with small sips every 15 minutes and increase as tolerated. Use Gatorade/Pedialyte/Coconut water or rehydration packets to help stay hydrated.  Vitamin water and plain water do not contain rehydrating electrolytes.  Hold off on solids for 12-18 hours then advance to a BRAT/bland diet for the next several days. Increase as tolerated. Avoid all spicy, greasy, and acidic foods as these will make your symptoms worse. If you are unable to tolerate anything by mouth even after taking prescription meds and following the above instructions, you will likely need to go to the ER for IV fluids.  3. Perform good handwashing and Clorox/Lysol all surfaces well (especially bathrooms) to prevent spread of infection.   4.  For patients above 6 months of age who are not allergic to and are not on anticoagulants, you can alternate Tylenol and Motrin every 4-6 hours for fever above 100.4F and/or pain.  For patients less than 6 months of age, allergic to or intolerant to NSAIDS, have gastritis, gastric ulcers, or history of GI bleeds, are pregnant, or are on anticoagulant therapy, you can take Tylenol every 4 hours as needed for fever above 100.4F and/or pain.   5. You should schedule a follow-up appointment with your Primary Care Provider/Pediatrician for recheck in 2-3 days or as directed at this visit.   6.  If your condition fails to improve in a timely manner, you should receive another evaluation by your Primary Care Provider/Pediatrician to discuss your concerns or return to urgent care for a recheck.  If your condition worsens at any time, you should report immediately to your nearest Emergency Department for further evaluation. **You must understand that you have received Urgent Care treatment only and that you may be released before all of your medical problems are known or treated. You, the  patient, are responsible to arrange for follow-up care as instructed.

## 2023-09-28 NOTE — PROGRESS NOTES
"Subjective:      Patient ID: Carmina Amador is a 57 y.o. female.    Vitals:  height is 5' 4" (1.626 m) and weight is 122.5 kg (270 lb). Her oral temperature is 99.1 °F (37.3 °C). Her blood pressure is 133/88 and her pulse is 81. Her respiration is 20 and oxygen saturation is 95%.     Chief Complaint: Diarrhea    Patient states she is a having diarrhea , vomiting , and headaches since yesterday . She also states she had a fall Monday and landed on her back and hit her head . She also states she is having both knee pain. States she has had problems with her knees for a long time now and needs surgery but orthopedic wants her to lose weight before surgery.    Diarrhea   This is a new problem. The current episode started yesterday. The problem occurs 5 to 10 times per day. The problem has been unchanged. The stool consistency is described as Watery. The patient states that diarrhea does not awaken her from sleep. Associated symptoms include headaches and vomiting. Nothing aggravates the symptoms. There are no known risk factors. Treatments tried: Anti-Diarrheal. The treatment provided no relief.   Knee Pain   Incident onset: acute exacerbation of chronic bilateral knee pain. The pain is moderate. The pain has been Constant since onset. Associated symptoms comments: Pain with walking. The symptoms are aggravated by movement and weight bearing.       Gastrointestinal:  Positive for vomiting and diarrhea.   Neurological:  Positive for headaches.      Objective:     Physical Exam   Constitutional: She is oriented to person, place, and time. She appears well-developed.  Non-toxic appearance. No distress.   HENT:   Head: Normocephalic and atraumatic.   Ears:   Right Ear: External ear normal.   Left Ear: External ear normal.   Nose: Nose normal.   Mouth/Throat: Mucous membranes are normal.   Eyes: Conjunctivae and lids are normal.   Neck: Trachea normal. Neck supple.   Cardiovascular: Normal rate, regular rhythm and normal " heart sounds.   Pulmonary/Chest: Effort normal and breath sounds normal. No respiratory distress.   Abdominal: Normal appearance and bowel sounds are normal. She exhibits no distension and no mass. Soft. There is no abdominal tenderness. There is no rebound, no guarding, no left CVA tenderness and no right CVA tenderness.   Musculoskeletal: Normal range of motion.         General: Normal range of motion.      Right knee: She exhibits swelling. She exhibits no bony tenderness.      Left knee: She exhibits swelling. She exhibits no bony tenderness.        Legs:    Neurological: She is alert and oriented to person, place, and time. She has normal strength.   Skin: Skin is warm, dry, intact, not diaphoretic and not pale.   Psychiatric: Her speech is normal and behavior is normal. Judgment and thought content normal.   Nursing note and vitals reviewed.      Assessment:     1. Diarrhea, unspecified type    2. Pain in both knees, unspecified chronicity        Plan:       Diarrhea, unspecified type    Pain in both knees, unspecified chronicity  -     ketorolac injection 30 mg          Medical Decision Making:   History:   Old Medical Records: I decided to obtain old medical records.

## 2024-04-19 ENCOUNTER — OFFICE VISIT (OUTPATIENT)
Dept: URGENT CARE | Facility: CLINIC | Age: 59
End: 2024-04-19
Payer: MEDICAID

## 2024-04-19 VITALS
WEIGHT: 270 LBS | HEART RATE: 83 BPM | BODY MASS INDEX: 46.1 KG/M2 | RESPIRATION RATE: 19 BRPM | SYSTOLIC BLOOD PRESSURE: 156 MMHG | TEMPERATURE: 98 F | DIASTOLIC BLOOD PRESSURE: 83 MMHG | OXYGEN SATURATION: 95 % | HEIGHT: 64 IN

## 2024-04-19 DIAGNOSIS — G89.29 CHRONIC PAIN OF BOTH KNEES: Primary | ICD-10-CM

## 2024-04-19 DIAGNOSIS — M25.562 CHRONIC PAIN OF BOTH KNEES: Primary | ICD-10-CM

## 2024-04-19 DIAGNOSIS — M25.562 CHRONIC PAIN OF LEFT KNEE: ICD-10-CM

## 2024-04-19 DIAGNOSIS — G89.29 CHRONIC PAIN OF LEFT KNEE: ICD-10-CM

## 2024-04-19 DIAGNOSIS — M25.561 CHRONIC PAIN OF BOTH KNEES: Primary | ICD-10-CM

## 2024-04-19 PROCEDURE — 99213 OFFICE O/P EST LOW 20 MIN: CPT | Mod: S$GLB,,,

## 2024-04-19 RX ORDER — KETOROLAC TROMETHAMINE 30 MG/ML
30 INJECTION, SOLUTION INTRAMUSCULAR; INTRAVENOUS
Status: COMPLETED | OUTPATIENT
Start: 2024-04-19 | End: 2024-04-19

## 2024-04-19 RX ORDER — DICLOFENAC SODIUM 10 MG/G
2 GEL TOPICAL 4 TIMES DAILY
Qty: 100 G | Refills: 0 | Status: SHIPPED | OUTPATIENT
Start: 2024-04-19

## 2024-04-19 RX ADMIN — KETOROLAC TROMETHAMINE 30 MG: 30 INJECTION, SOLUTION INTRAMUSCULAR; INTRAVENOUS at 04:04

## 2024-04-19 NOTE — PATIENT INSTRUCTIONS
Rest, apply ice to knees. Wear knee brace while ambulating. Follow up with Orthopedics at your next appointment.   Please drink plenty of fluids.  Please get plenty of rest.  Please return here or go to the Emergency Department for any concerns or worsening of condition.    If you were not prescribed an anti-inflammatory medication, and if you do not have any history of stomach/intestinal ulcers, or kidney disease, or are not taking a blood thinner such as Coumadin, Plavix, Pradaxa, Eloquis, or Xaralta for example, it is OK to take over the counter Ibuprofen or Advil or Motrin or Aleve as directed.  Do not take these medications on an empty stomach.  If you lose control of your bowel and/or bladder, please go to the nearest Emergency Department immediately.  If you lose sensation in between your legs by your genitalia and/or rectum, please go to the nearest Emergency Department immediately.  If you lose control or sensation of any extremity, please go to the nearest Emergency Department immediately.    Moist heat (heating Pad) several times a day to back for relief and comfort.  If you  smoke, please stop smoking.    Please follow up with your primary care doctor or specialist as needed.  Natalie Price MD  [phone]    You must understand that you have received treatment at an Urgent Care facility only, and that you may be  released before all of your medical problems are known or treated. Urgent Care facilities are not equipped to  handle life threatening emergencies. It is recommended that you seek care at an Emergency Department for  further evaluation of worsening or concerning symptoms, or possibly life threatening conditions as  discussed.

## 2024-04-19 NOTE — PROGRESS NOTES
"Subjective:      Patient ID: Carmina Amador is a 58 y.o. female.    Vitals:  height is 5' 4" (1.626 m) and weight is 122.5 kg (270 lb). Her tympanic temperature is 97.8 °F (36.6 °C). Her blood pressure is 156/83 (abnormal) and her pulse is 83. Her respiration is 19 and oxygen saturation is 95%.     Chief Complaint: knee pain,back pain,back pain    PT with PMHx of arthritis of b/l knees reports to clinic for bilateral knee and hip pain. Pt denies any recent injury, trauma, falls. Stated symptoms have been progressing for 2 weeks. Pt reports Hx of knee steroid injections with relief via ortho. Stated she has orthopedic f/u on may 13th. Stated she has discussed with orthopedics possible knee replacement. Pt denies fever, dysuria, hematuria, bowel/bladder incontinences, numbness/tingling, saddle paresthesias, calf pain.     Knee Pain   The incident occurred more than 1 week ago. The pain is present in the right knee and left knee. The pain is at a severity of 10/10. The pain is severe. Associated symptoms include a loss of motion and tingling. Nothing aggravates the symptoms. The treatment provided no relief.       Constitution: Negative for fever.   Gastrointestinal:  Negative for bowel incontinence.   Genitourinary:  Negative for dysuria, frequency, bladder incontinence and hematuria.   Musculoskeletal:  Positive for joint pain, joint swelling and arthritis.      Objective:     Physical Exam   Constitutional: She is oriented to person, place, and time. She appears well-developed. She is cooperative.  Non-toxic appearance. She does not appear ill. No distress.   HENT:   Head: Normocephalic and atraumatic.   Ears:   Right Ear: Hearing and external ear normal.   Left Ear: Hearing and external ear normal.   Nose: Nose normal. No mucosal edema or nasal deformity. No epistaxis. Right sinus exhibits no maxillary sinus tenderness and no frontal sinus tenderness. Left sinus exhibits no maxillary sinus tenderness and no " frontal sinus tenderness.   Mouth/Throat: Uvula is midline, oropharynx is clear and moist and mucous membranes are normal. No trismus in the jaw. Normal dentition. No uvula swelling.   Eyes: Conjunctivae and lids are normal.   Neck: Trachea normal and phonation normal. Neck supple.   Cardiovascular: Normal rate.   Pulmonary/Chest: Effort normal.   Abdominal: Normal appearance.   Musculoskeletal: Normal range of motion.         General: Swelling present. No signs of injury. Normal range of motion.      Right knee: She exhibits normal range of motion.      Left knee: She exhibits normal range of motion. Tenderness found.      Comments: DP pulse intact bilaterally. Sensation intact. Full extension/flexion of knee. Pt reports global tenderness of left knee joint. Pt ambulates slowly in clinic.    Neurological: She is alert and oriented to person, place, and time. She exhibits normal muscle tone.   Skin: Skin is warm, dry, intact and not diaphoretic.   Psychiatric: Her speech is normal and behavior is normal. Judgment and thought content normal.   Nursing note and vitals reviewed.      Assessment:     1. Chronic pain of both knees    2. Chronic pain of left knee        Plan:       Chronic pain of both knees  -     ketorolac injection 30 mg  -     diclofenac sodium (VOLTAREN) 1 % Gel; Apply 2 g topically 4 (four) times daily.  Dispense: 100 g; Refill: 0    Chronic pain of left knee  -     KNEE BRACE FOR HOME USE  -     diclofenac sodium (VOLTAREN) 1 % Gel; Apply 2 g topically 4 (four) times daily.  Dispense: 100 g; Refill: 0      You were give a Toradol injection in clinic, do not take Voltaren oral pills today.   No imaging available today in clinic due to no radiologist technician available. Patient made aware in clinic and advised that this may limit their visit at Urgent Care. Advised if symptoms worsen then  patient should go to Emergency Room for further evaluation.     Rest, apply ice to knees. Wear knee brace  "while ambulating. Follow up with Orthopedics at your next appointment.   Please drink plenty of fluids.  Please get plenty of rest.  Please return here or go to the Emergency Department for any concerns or worsening of condition.    If you were not prescribed an anti-inflammatory medication, and if you do not have any history of stomach/intestinal ulcers, or kidney disease, or are not taking a blood thinner such as Coumadin, Plavix, Pradaxa, Eloquis, or Xaralta for example, it is OK to take over the counter Ibuprofen or Advil or Motrin or Aleve as directed.  Do not take these medications on an empty stomach.  If you lose control of your bowel and/or bladder, please go to the nearest Emergency Department immediately.  If you lose sensation in between your legs by your genitalia and/or rectum, please go to the nearest Emergency Department immediately.  If you lose control or sensation of any extremity, please go to the nearest Emergency Department immediately.    Moist heat (heating Pad) several times a day to back for relief and comfort.  If you  smoke, please stop smoking.    Please follow up with your primary care doctor or specialist as needed.  Naatlie Price MD  [phone]    You must understand that you have received treatment at an Urgent Care facility only, and that you may be  released before all of your medical problems are known or treated. Urgent Care facilities are not equipped to  handle life threatening emergencies. It is recommended that you seek care at an Emergency Department for  further evaluation of worsening or concerning symptoms, or possibly life threatening conditions as  discussed.        Medical Decision Making:   History:   Old Medical Records: I decided to obtain old medical records.  Old Records Summarized: records from clinic visits.       <> Summary of Records: Reviewed knee xray from 728//2021- "Extreme osteoarthritis affecting both the right and left most pronounced changes " "across the lateral tibiofemoral compartment more pronounced on right side and predominately involving the lateral joint space on left side.  This appears to be progressive as compared to previous.  Patellofemoral compartment is involved both on the right and on the left."           "

## 2024-05-01 ENCOUNTER — OFFICE VISIT (OUTPATIENT)
Dept: URGENT CARE | Facility: CLINIC | Age: 59
End: 2024-05-01
Payer: MEDICAID

## 2024-05-01 VITALS
BODY MASS INDEX: 46.1 KG/M2 | SYSTOLIC BLOOD PRESSURE: 123 MMHG | RESPIRATION RATE: 18 BRPM | WEIGHT: 270 LBS | OXYGEN SATURATION: 97 % | HEART RATE: 90 BPM | TEMPERATURE: 99 F | DIASTOLIC BLOOD PRESSURE: 85 MMHG | HEIGHT: 64 IN

## 2024-05-01 DIAGNOSIS — K52.9 GASTROENTERITIS: Primary | ICD-10-CM

## 2024-05-01 PROCEDURE — 99214 OFFICE O/P EST MOD 30 MIN: CPT | Mod: S$GLB,,, | Performed by: FAMILY MEDICINE

## 2024-05-01 RX ORDER — PROMETHAZINE HYDROCHLORIDE 25 MG/1
25 TABLET ORAL EVERY 6 HOURS PRN
Qty: 20 TABLET | Refills: 0 | Status: SHIPPED | OUTPATIENT
Start: 2024-05-01 | End: 2024-06-13

## 2024-05-01 RX ORDER — PROMETHAZINE HYDROCHLORIDE 25 MG/1
25 SUPPOSITORY RECTAL EVERY 6 HOURS PRN
Qty: 5 SUPPOSITORY | Refills: 0 | Status: SHIPPED | OUTPATIENT
Start: 2024-05-01 | End: 2024-06-13

## 2024-05-01 RX ORDER — DIPHENOXYLATE HYDROCHLORIDE AND ATROPINE SULFATE 2.5; .025 MG/1; MG/1
1 TABLET ORAL 4 TIMES DAILY PRN
Qty: 20 TABLET | Refills: 0 | Status: SHIPPED | OUTPATIENT
Start: 2024-05-01

## 2024-05-01 NOTE — LETTER
May 1, 2024  Carmina Amador  [address] [place] [id number]                [facility] [place]  [address] [place] [address] A  [facility] [address]  Phone: [phone]  Fax: [phone] Cramina Amador was seen and treated in our Urgent Care department on 5/1/2024. She may return to work in 2 - 3 days.      If you have any questions or concerns, please don't hesitate to call.        Sincerely,        Caleb Murphy MD

## 2024-05-01 NOTE — PROGRESS NOTES
"Subjective:      Patient ID: Carmina Amador is a 58 y.o. female.    Vitals:  height is 5' 4" (1.626 m) and weight is 122.5 kg (270 lb). Her tympanic temperature is 98.5 °F (36.9 °C). Her blood pressure is 123/85 and her pulse is 90. Her respiration is 18 and oxygen saturation is 97%.     Chief Complaint: Nausea    Pt reports starting with nausea and dry heaving coal starting on Monday.     Nausea  This is a new problem. Episode onset: 4 days. The problem occurs intermittently. The problem has been unchanged. Associated symptoms include congestion, coughing, headaches and nausea. Pertinent negatives include no fever. Associated symptoms comments: Lack of appetite, diarrhea  . Nothing aggravates the symptoms. Treatments tried: medicine for nausea. The treatment provided no relief.       Constitution: Negative. Negative for fever.   HENT:  Positive for congestion.    Cardiovascular: Negative.    Eyes: Negative.    Respiratory:  Positive for cough.    Gastrointestinal:  Positive for nausea.   Endocrine: negative.   Genitourinary: Negative.    Musculoskeletal: Negative.    Skin: Negative.    Allergic/Immunologic: Negative.    Neurological:  Positive for headaches.   Hematologic/Lymphatic: Negative.    Psychiatric/Behavioral: Negative.        Objective:     Physical Exam   Constitutional: She is oriented to person, place, and time. She appears well-developed.   HENT:   Head: Normocephalic and atraumatic.   Ears:   Right Ear: External ear normal.   Left Ear: External ear normal.   Nose: Nose normal.   Mouth/Throat: Mucous membranes are normal.   Eyes: Conjunctivae and lids are normal.   Neck: Trachea normal. Neck supple.   Cardiovascular: Normal rate, regular rhythm and normal heart sounds.   Pulmonary/Chest: Effort normal and breath sounds normal. No respiratory distress.   Abdominal: Normal appearance and bowel sounds are normal. She exhibits no distension and no mass. Soft. There is no abdominal tenderness. "   Musculoskeletal: Normal range of motion.         General: Normal range of motion.   Neurological: She is alert and oriented to person, place, and time. She has normal strength.   Skin: Skin is warm, dry, intact, not diaphoretic and not pale.   Psychiatric: Her speech is normal and behavior is normal. Judgment and thought content normal.   Nursing note and vitals reviewed.      Assessment:     1. Gastroenteritis        Plan:       Gastroenteritis  -     promethazine (PHENERGAN) 25 MG suppository; Place 1 suppository (25 mg total) rectally every 6 (six) hours as needed for Nausea.  Dispense: 5 suppository; Refill: 0  -     promethazine (PHENERGAN) 25 MG tablet; Take 1 tablet (25 mg total) by mouth every 6 (six) hours as needed for Nausea.  Dispense: 20 tablet; Refill: 0  -     diphenoxylate-atropine 2.5-0.025 mg (LOMOTIL) 2.5-0.025 mg per tablet; Take 1 tablet by mouth 4 (four) times daily as needed for Diarrhea.  Dispense: 20 tablet; Refill: 0      Please drink plenty of fluids.  Please get plenty of rest.  Clear liquid diet as instructed for 2 - 3 days or until symptoms improve.  Please return here or go to the Emergency Department for any concerns or worsening of condition.  If you were prescribed antibiotics, please take them to completion.  If not allergic, please take over the counter Tylenol (Acetaminophen) as directed for control of pain and/or fever.  Motrin (advil) or Alleve may help a fever, but they may also worsen your Nausea and Vomiting.    Please follow up with your primary care doctor or specialist as needed.    Natalie Price MD  [phone]    If you  smoke, please stop smoking.    You must understand that you have received treatment at an Urgent Care facility only, and that you may be  released before all of your medical problems are known or treated. Urgent Care facilities are not equipped to  handle life threatening emergencies. It is recommended that you seek care at an Emergency  Department for  further evaluation of worsening or concerning symptoms, or possibly life threatening conditions as  discussed.

## 2024-05-01 NOTE — PATIENT INSTRUCTIONS
Please drink plenty of fluids.  Please get plenty of rest.  Clear liquid diet as instructed for 2 - 3 days or until symptoms improve.  Please return here or go to the Emergency Department for any concerns or worsening of condition.  If you were prescribed antibiotics, please take them to completion.  If not allergic, please take over the counter Tylenol (Acetaminophen) as directed for control of pain and/or fever.  Motrin (advil) or Alleve may help a fever, but they may also worsen your Nausea and Vomiting.    Please follow up with your primary care doctor or specialist as needed.    Natalie Price MD  [phone]    If you  smoke, please stop smoking.    You must understand that you have received treatment at an Urgent Care facility only, and that you may be  released before all of your medical problems are known or treated. Urgent Care facilities are not equipped to  handle life threatening emergencies. It is recommended that you seek care at an Emergency Department for  further evaluation of worsening or concerning symptoms, or possibly life threatening conditions as  discussed.    Clear Liquid Diet    Clear liquids are any liquid that you can see through. They are also very easy to digest. You may be put on a clear liquid diet if you are recovering from irritation or infection of the stomach or intestinal tract. This diet may also be used before surgery or special procedures such as a colonoscopy. You should not be on this diet for more than 3 days. Below are some clear liquids you can have on this diet.  Adults and children over 2 years old  Adults should drink a total of 2 to 3 quarts of liquid per day. It may be easier to drink small frequent servings rather than a few large ones. Liquids can include:  Fruit juices. Strained orange juice or lemonade (no pulp); apple, grape, and cranberry juice; clear fruit drinks  Beverages. Sport drinks, sodas, mineral water (plain or flavored), tea, black coffee,  liquid gelatin (add twice the recommended amount of water)  Soups. Clear broth  Desserts. Plain gelatin, popsicles, fruit juice bars  Children under 2 years old  Oral rehydration fluids are available at drug stores and most grocery stores. You dont need a prescription.  Date Last Reviewed: 8/1/2016 © 2000-2016 [facility]. [address] [place] [address], [place] [address]. All rights reserved. This information is not intended as a substitute for professional medical care. Always follow your healthcare professional's instructions.      Viral Gastroenteritis (Adult)    Gastroenteritis is commonly called the stomach flu. It is most often caused by a virus that affects the stomach and intestinal tract and usually lasts from 2 to 7 days. Common viruses causing gastroenteritis include norovirus, rotavirus, and hepatitis A. Non-viral causes of gastroenteritis include bacteria, parasites, and toxins.  The danger from repeated vomiting or diarrhea is dehydration. This is the loss of too much fluid from the body. When this occurs, body fluids must be replaced. Antibiotics do not help with this illness because it is usually viral.Simple home treatment will be helpful.  Symptoms of viral gastroenteritis may include:  Watery, loose stools  Stomach pain or abdominal cramps  Fever and chills  Nausea and vomiting  Loss of bowel control  Headache  Home care  Gastroenteritis is transmitted by contact with the stool or vomit of an infected person. This can occur from person to person or from contact with a contaminated surface.  Follow these guidelines when caring for yourself at home:  If symptoms are severe, rest at home for the next 24 hours or until you are feeling better.  Wash your hands with soap and water or use alcohol-based  to prevent the spread of infection. Wash your hands after touching anyone who is sick.  Wash your hands or use alcohol-based  after using the toilet and before meals. Clean the  toilet after each use.  Remember these tips when preparing food:  People with diarrhea should not prepare or serve food to others. When preparing foods, wash your hands before and after.  Wash your hands after using cutting boards, countertops, knives, or utensils that have been in contact with raw food.  Keep uncooked meats away from cooked and ready-to-eat foods.  Medicine  You may use acetaminophen or NSAID medicines like ibuprofen or naproxen to control fever unless another medicine was given. If you have chronic liver or kidney disease, talk with your healthcare provider before using these medicines. Also talk with your provider if you've had a stomach ulcer or gastrointestinal bleeding. Don't give aspirin to anyone under 18 years of age who is ill with a fever. It may cause severe liver damage. Don't use NSAIDS is you are already taking one for another condition (like arthritis) or are on aspirin (such as for heart disease or after a stroke).  If medicine for vomiting or diarrhea are prescribed, take these only as directed. Do not take over-the-counter medicines for vomiting or diarrhea unless instructed by your healthcare provider.  Diet  Follow these guidelines for food:  Water and liquids are important so you don't get dehydrated. Drink a small amount at a time or suck on ice chips if you are vomiting.  If you eat, avoid fatty, greasy, spicy, or fried foods.  Don't eat dairy if you have diarrhea. This can make diarrhea worse.  Avoid tobacco, alcohol, and caffeine which may worsen symptoms.  During the first 24 hours (the first full day), follow the diet below:  Beverages. Sports drinks, soft drinks without caffeine, ginger ale, mineral water (plain or flavored), decaffeinated tea and coffee. If you are very dehydrated, sports drinks aren't a good choice. They have too much sugar and not enough electrolytes. In this case, commercially available products called oral rehydration solutions, are best.  Soups.  Eat clear broth, consommé, and bouillon.  Desserts. Eat gelatin, popsicles, and fruit juice bars.  During the next 24 hours (the second day), you may add the following to the above:  Hot cereal, plain toast, bread, rolls, and crackers  Plain noodles, rice, mashed potatoes, chicken noodle or rice soup  Unsweetened canned fruit (avoid pineapple), bananas  Limit fat intake to less than 15 grams per day. Do this by avoiding margarine, butter, oils, mayonnaise, sauces, gravies, fried foods, peanut butter, meat, poultry, and fish.  Limit fiber and avoid raw or cooked vegetables, fresh fruits (except bananas), and bran cereals.  Limit caffeine and chocolate. Don't use spices or seasonings other than salt.  Limit dairy products.  Avoid alcohol.  During the next 24 hours:  Gradually resume a normal diet as you feel better and your symptoms improve.  If at any time it starts getting worse again, go back to clear liquids until you feel better.  Follow-up care  Follow up with your healthcare provider, or as advised. Call your provider if you don't get better within 24 hours or if diarrhea lasts more than a week. Also follow up if you are unable to keep down liquids and get dehydrated. If a stool (diarrhea) sample was taken, call as directed for the results.  Call 911  Call 911 if any of these occur:  Trouble breathing  Chest pain  Confused  Severe drowsiness or trouble awakening  Fainting or loss of consciousness  Rapid heart rate  Seizure  Stiff neck  When to seek medical advice  Call your healthcare provider right away if any of these occur:  Abdominal pain that gets worse  Continued vomiting (unable to keep liquids down)  Frequent diarrhea (more than 5 times a day)  Blood in vomit or stool (black or red color)  Dark urine, reduced urine output, or extreme thirst  Weakness or dizziness  Drowsiness  Fever of 100.4°F (38°C) oral or higher that does not get better with fever medicine  New rash  Date Last Reviewed: 1/3/2016  ©  2547-7653 [employer]. [address] [place] [address], [place] [address]. All rights reserved. This information is not intended as a substitute for professional medical care. Always follow your healthcare professional's instructions.

## 2024-06-13 ENCOUNTER — OFFICE VISIT (OUTPATIENT)
Dept: URGENT CARE | Facility: CLINIC | Age: 59
End: 2024-06-13
Payer: MEDICAID

## 2024-06-13 VITALS
OXYGEN SATURATION: 99 % | SYSTOLIC BLOOD PRESSURE: 102 MMHG | HEART RATE: 101 BPM | TEMPERATURE: 99 F | DIASTOLIC BLOOD PRESSURE: 72 MMHG | RESPIRATION RATE: 19 BRPM

## 2024-06-13 DIAGNOSIS — T63.461A WASP STING, ACCIDENTAL OR UNINTENTIONAL, INITIAL ENCOUNTER: Primary | ICD-10-CM

## 2024-06-13 PROCEDURE — 99213 OFFICE O/P EST LOW 20 MIN: CPT | Mod: S$GLB,,,

## 2024-06-13 RX ORDER — TRIAMCINOLONE ACETONIDE 1 MG/G
CREAM TOPICAL 2 TIMES DAILY
Qty: 15 G | Refills: 0 | Status: SHIPPED | OUTPATIENT
Start: 2024-06-13

## 2024-06-13 RX ORDER — CETIRIZINE HYDROCHLORIDE 10 MG/1
10 TABLET ORAL DAILY
Qty: 30 TABLET | Refills: 0 | Status: SHIPPED | OUTPATIENT
Start: 2024-06-13 | End: 2025-06-13

## 2024-06-13 NOTE — PROGRESS NOTES
Subjective:      Patient ID: Carmina Amador is a 58 y.o. female.    Vitals:  tympanic temperature is 98.7 °F (37.1 °C). Her blood pressure is 102/72 and her pulse is 101. Her respiration is 19 and oxygen saturation is 99%.     Chief Complaint: Insect Bite    59 yo F presents with several wasp stings to L arm and hand. Incident occurred yesterday. Now having pain, swelling and itching to affected area.     Insect Bite  This is a new problem. The current episode started yesterday. The problem has been unchanged. Pertinent negatives include no anorexia, arthralgias, chills, congestion, fever, headaches, joint swelling, sore throat, swollen glands or visual change. Nothing aggravates the symptoms. She has tried nothing for the symptoms. The treatment provided no relief.       Constitution: Negative for chills and fever.   HENT:  Negative for congestion and sore throat.    Musculoskeletal:  Negative for joint pain and joint swelling.   Skin:  Positive for erythema.   Neurological:  Negative for headaches.      Objective:     Physical Exam   Constitutional: She is oriented to person, place, and time. She appears well-developed.  Non-toxic appearance. She does not appear ill. No distress. obesity  HENT:   Head: Normocephalic and atraumatic. Head is without abrasion, without contusion and without laceration.   Ears:   Right Ear: External ear normal.   Left Ear: External ear normal.   Nose: Nose normal.   Mouth/Throat: Oropharynx is clear and moist and mucous membranes are normal.   Eyes: Conjunctivae, EOM and lids are normal.   Neck: Trachea normal and phonation normal. Neck supple.   Cardiovascular: Normal rate, regular rhythm and normal pulses.   Pulmonary/Chest: Effort normal. No stridor. No respiratory distress.   Abdominal: Normal appearance.   Musculoskeletal: Normal range of motion.         General: Normal range of motion.   Neurological: She is alert and oriented to person, place, and time.   Skin: Skin is warm,  dry, intact, not diaphoretic and no rash. Capillary refill takes less than 2 seconds. erythema No abrasion, No burn, No bruising and No ecchymosis         Comments: Swelling and erythema to L pinky extending through wrist.  Several small punctures noted. No drainage.    Psychiatric: Her speech is normal and behavior is normal. Judgment and thought content normal.   Nursing note and vitals reviewed.      Assessment:     1. Wasp sting, accidental or unintentional, initial encounter        Plan:       Wasp sting, accidental or unintentional, initial encounter  -     cetirizine (ZYRTEC) 10 MG tablet; Take 1 tablet (10 mg total) by mouth once daily.  Dispense: 30 tablet; Refill: 0  -     triamcinolone acetonide 0.1% (KENALOG) 0.1 % cream; Apply topically 2 (two) times daily.  Dispense: 15 g; Refill: 0      Patient Instructions   1.  Apply steroid cream sparingly 2x daily.  An anti-histamine such as Zyrtec may also help your symptoms.  Ibuprofen will help with swelling and pain.  Take all  medications as directed.   2.  Rest and keep yourself/patient well hydrated.  3.  You can alternate Tylenol and Motrin every 4-6 hours for fever above 100.4F and/or pain.   4. You should schedule a follow-up appointment with your Primary Care Provider for recheck in 2-3 days or as directed at this visit.   5.  If your condition fails to improve in a timely manner, you should receive another evaluation by your Primary Care Provider to discuss your concerns or return to urgent care for a recheck.  If your condition worsens at any time, you should report immediately to your nearest Emergency Department for further evaluation. **You must understand that you have received Urgent Care treatment only and that you may be released before all of your medical problems are known or treated. You, the patient, are responsible to arrange for follow-up care as instructed.

## 2024-06-14 NOTE — PATIENT INSTRUCTIONS
1.  Apply steroid cream sparingly 2x daily.  An anti-histamine such as Zyrtec may also help your symptoms.  Ibuprofen will help with swelling and pain.  Take all  medications as directed.   2.  Rest and keep yourself/patient well hydrated.  3.  You can alternate Tylenol and Motrin every 4-6 hours for fever above 100.4F and/or pain.   4. You should schedule a follow-up appointment with your Primary Care Provider for recheck in 2-3 days or as directed at this visit.   5.  If your condition fails to improve in a timely manner, you should receive another evaluation by your Primary Care Provider to discuss your concerns or return to urgent care for a recheck.  If your condition worsens at any time, you should report immediately to your nearest Emergency Department for further evaluation. **You must understand that you have received Urgent Care treatment only and that you may be released before all of your medical problems are known or treated. You, the patient, are responsible to arrange for follow-up care as instructed.

## 2024-08-04 ENCOUNTER — OFFICE VISIT (OUTPATIENT)
Dept: URGENT CARE | Facility: CLINIC | Age: 59
End: 2024-08-04
Payer: MEDICAID

## 2024-08-04 VITALS
RESPIRATION RATE: 19 BRPM | WEIGHT: 286.5 LBS | BODY MASS INDEX: 52.72 KG/M2 | HEART RATE: 88 BPM | TEMPERATURE: 98 F | HEIGHT: 62 IN | SYSTOLIC BLOOD PRESSURE: 126 MMHG | OXYGEN SATURATION: 99 % | DIASTOLIC BLOOD PRESSURE: 82 MMHG

## 2024-08-04 DIAGNOSIS — M25.551 PAIN OF RIGHT HIP: Primary | ICD-10-CM

## 2024-08-04 DIAGNOSIS — M25.562 ACUTE PAIN OF LEFT KNEE: ICD-10-CM

## 2024-08-04 PROCEDURE — 99214 OFFICE O/P EST MOD 30 MIN: CPT | Mod: ,,, | Performed by: NURSE PRACTITIONER

## 2024-08-04 PROCEDURE — 73502 X-RAY EXAM HIP UNI 2-3 VIEWS: CPT | Mod: RT,S$GLB,, | Performed by: RADIOLOGY

## 2024-08-04 PROCEDURE — 73562 X-RAY EXAM OF KNEE 3: CPT | Mod: LT,S$GLB,, | Performed by: RADIOLOGY

## 2024-08-04 RX ORDER — DEXAMETHASONE SODIUM PHOSPHATE 10 MG/ML
10 INJECTION INTRAMUSCULAR; INTRAVENOUS
Status: COMPLETED | OUTPATIENT
Start: 2024-08-04 | End: 2024-08-04

## 2024-08-04 RX ORDER — MELOXICAM 15 MG/1
15 TABLET ORAL DAILY
Qty: 10 TABLET | Refills: 0 | Status: SHIPPED | OUTPATIENT
Start: 2024-08-04 | End: 2024-08-14

## 2024-08-04 RX ADMIN — DEXAMETHASONE SODIUM PHOSPHATE 10 MG: 10 INJECTION INTRAMUSCULAR; INTRAVENOUS at 03:08

## 2024-08-16 ENCOUNTER — HOSPITAL ENCOUNTER (EMERGENCY)
Facility: HOSPITAL | Age: 59
Discharge: HOME | End: 2024-08-16
Attending: STUDENT IN AN ORGANIZED HEALTH CARE EDUCATION/TRAINING PROGRAM
Payer: MEDICAID

## 2024-08-16 VITALS
BODY MASS INDEX: 51.65 KG/M2 | HEART RATE: 77 BPM | TEMPERATURE: 99 F | RESPIRATION RATE: 18 BRPM | WEIGHT: 284.31 LBS | OXYGEN SATURATION: 96 % | SYSTOLIC BLOOD PRESSURE: 117 MMHG | DIASTOLIC BLOOD PRESSURE: 81 MMHG
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
## 2024-08-16 PROCEDURE — 99284 EMERGENCY DEPT VISIT MOD MDM: CPT | Mod: 25

## 2024-08-16 RX ORDER — HYDROCODONE BITARTRATE AND ACETAMINOPHEN 5; 325 MG/1; MG/1
1 TABLET ORAL EVERY 4 HOURS PRN
Qty: 15 TABLET | Refills: 0 | Status: SHIPPED | OUTPATIENT
Start: 2024-08-16 | End: 2024-08-19

## 2024-08-16 NOTE — ED PROVIDER NOTES
Encounter Date: 2024       History     Chief Complaint   Patient presents with    Fall     58-year-old female with history of DVT, obesity, hypertension, presenting with right hip, knee, ankle pain.  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  She reports that she was lying down for about 30 minutes before anyone saw that she had fallen.  Since then has been ambulatory, but had pain down her right lower extremity.  Also reports mild pain in the right shoulder.  No head trauma.  Patient does report some mid lower back pain also, no numbness or weakness in bilateral lower extremities, saddle anesthesia, urinary retention.      Review of patient's allergies indicates:   Allergen Reactions    Pcn [penicillins] Swelling     Past Medical History:   Diagnosis Date    Abnormal uterine bleeding     Anticoagulant long-term use     Arthritis     Coronary artery disease     Depression     DVT (deep vein thrombosis) in pregnancy     s/p IVC    GERD (gastroesophageal reflux disease)     Hypertension     Postmenopausal bleeding      Past Surgical History:   Procedure Laterality Date    CAROTID STENT      CARPAL TUNNEL RELEASE Left      SECTION      x3    CHOLECYSTECTOMY      COLONOSCOPY N/A 3/15/2024    Procedure: COLONOSCOPY;  Surgeon: Dot Douglas MD;  Location: [facility];  Service: Endoscopy;  Laterality: N/A;    DILATION AND CURETTAGE OF UTERUS  12/7/15    SHERRY FILTER PLACEMENT      TUBAL LIGATION       Family History   Problem Relation Name Age of Onset    Parkinsonism Mother      Heart disease Mother      Hypertension Mother      Stroke Father      Colon cancer Daughter  39     Social History     Tobacco Use    Smoking status: Former     Types: Cigarettes     Passive exposure: Past    Smokeless tobacco: Never   Substance Use Topics    Alcohol use: No     Alcohol/week: 0.0 standard  drinks of alcohol    Drug use: No     Review of Systems   Constitutional:  Negative for fever.   HENT:  Negative for sore throat.    Respiratory:  Negative for shortness of breath.    Cardiovascular:  Negative for chest pain.   Gastrointestinal:  Negative for nausea.   Genitourinary:  Negative for dysuria.   Musculoskeletal:  Positive for back pain.        Right shoulder pain.  Right hip, knee, ankle pain.   Skin:  Negative for rash.   Neurological:  Negative for weakness.   Hematological:  Does not bruise/bleed easily.       Physical Exam     Initial Vitals [08/16/24 1728]   BP Pulse Resp Temp SpO2   117/81 77 18 98.7 °F (37.1 °C) 96 %      MAP       --         Physical Exam    Nursing note and vitals reviewed.  Constitutional: She appears well-developed.   HENT:   Head: Normocephalic and atraumatic.   Eyes: Pupils are equal, round, and reactive to light.   Neck:   Normal range of motion.  Cardiovascular:            No murmur heard.  Pulmonary/Chest: No respiratory distress.   Abdominal: Abdomen is soft.   Musculoskeletal:         General: No edema.      Cervical back: Normal range of motion.      Comments: Moving all extremities. No pain with palpation to bilateral shoulders, elbows, wrists, hips, knees, ankles. No spinal midline tenderness.       Specifically, right lower extremity DP pulse 2 +.  Sensation fully intact throughout extremity.  No tenderness to palpation to ankle joint, right knee joint, right hip joint.  Full range of motion intact without any discomfort.  Full range of motion right shoulder without any discomfort.  No joint swelling or tenderness     Neurological: She is alert.   Skin: Skin is warm.   Psychiatric: She has a normal mood and affect.         ED Course   Procedures  Labs Reviewed - No data to display       Imaging Results              X-Ray Hip 2 or 3 views Right with Pelvis when performed (In process)                      X-Ray Ankle Complete Right (In process)                       X-Ray Knee 1 or 2 View Right (In process)    Procedure changed from X-Ray Knee 3 View Right                    Medications - No data to display  Medical Decision Making  DDX: R/o fracture vs. sprain/contusion.  DX: XR.  TX: Analgesia PRN. Treatment/consult as indicated by studies.  Dispo: Pending studies. If studies WNL or pathology stabilized for discharge, discharge to f/u with PMD vs. orthopedics with precautions for RTED and supportive care recommendations.        Amount and/or Complexity of Data Reviewed  Radiology: ordered.                                      Clinical Impression:  Final diagnoses:  [W19.XXXA] Brett Cox MD  08/16/24 1735

## 2024-08-16 NOTE — ED TRIAGE NOTES
58 y.o. female presents to ER Room/bed info not found   Chief Complaint   Patient presents with    Fall   .   C/o fall yesterday, c/o pain to right hip radiating down right leg, pain to right upper arm

## 2024-11-03 ENCOUNTER — HOSPITAL ENCOUNTER (EMERGENCY)
Facility: HOSPITAL | Age: 59
Discharge: HOME | End: 2024-11-03
Attending: SURGERY
Payer: MEDICAID

## 2024-11-03 VITALS
OXYGEN SATURATION: 97 % | BODY MASS INDEX: 49.49 KG/M2 | TEMPERATURE: 99 F | HEART RATE: 79 BPM | SYSTOLIC BLOOD PRESSURE: 134 MMHG | HEIGHT: 64 IN | RESPIRATION RATE: 18 BRPM | DIASTOLIC BLOOD PRESSURE: 84 MMHG | WEIGHT: 289.88 LBS

## 2024-11-03 DIAGNOSIS — R55 NEAR SYNCOPE: Primary | ICD-10-CM

## 2024-11-03 DIAGNOSIS — R55 SYNCOPE: ICD-10-CM

## 2024-11-03 LAB
ALBUMIN SERPL BCP-MCNC: 3.6 G/DL (ref 3.5–5.2)
ALP SERPL-CCNC: 69 U/L (ref 40–150)
ALT SERPL W/O P-5'-P-CCNC: 22 U/L (ref 10–44)
ANION GAP SERPL CALC-SCNC: 9 MMOL/L (ref 8–16)
AST SERPL-CCNC: 19 U/L (ref 10–40)
BASOPHILS # BLD AUTO: 0.03 K/UL (ref 0–0.2)
BASOPHILS NFR BLD: 0.6 % (ref 0–1.9)
BILIRUB SERPL-MCNC: 0.7 MG/DL (ref 0.1–1)
BUN SERPL-MCNC: 17 MG/DL (ref 6–20)
CALCIUM SERPL-MCNC: 9.5 MG/DL (ref 8.7–10.5)
CHLORIDE SERPL-SCNC: 105 MMOL/L (ref 95–110)
CO2 SERPL-SCNC: 27 MMOL/L (ref 23–29)
CREAT SERPL-MCNC: 0.8 MG/DL (ref 0.5–1.4)
DIFFERENTIAL METHOD BLD: ABNORMAL
EOSINOPHIL # BLD AUTO: 0.1 K/UL (ref 0–0.5)
EOSINOPHIL NFR BLD: 1.3 % (ref 0–8)
ERYTHROCYTE [DISTWIDTH] IN BLOOD BY AUTOMATED COUNT: 13.2 % (ref 11.5–14.5)
EST. GFR  (NO RACE VARIABLE): >60 ML/MIN/1.73 M^2
GLUCOSE SERPL-MCNC: 107 MG/DL (ref 70–110)
HCT VFR BLD AUTO: 37.2 % (ref 37–48.5)
HGB BLD-MCNC: 12.1 G/DL (ref 12–16)
IMM GRANULOCYTES # BLD AUTO: 0.01 K/UL (ref 0–0.04)
IMM GRANULOCYTES NFR BLD AUTO: 0.2 % (ref 0–0.5)
LYMPHOCYTES # BLD AUTO: 2.2 K/UL (ref 1–4.8)
LYMPHOCYTES NFR BLD: 40.3 % (ref 18–48)
MCH RBC QN AUTO: 32.9 PG (ref 27–31)
MCHC RBC AUTO-ENTMCNC: 32.5 G/DL (ref 32–36)
MCV RBC AUTO: 101 FL (ref 82–98)
MONOCYTES # BLD AUTO: 0.4 K/UL (ref 0.3–1)
MONOCYTES NFR BLD: 8.2 % (ref 4–15)
NEUTROPHILS # BLD AUTO: 2.6 K/UL (ref 1.8–7.7)
NEUTROPHILS NFR BLD: 49.4 % (ref 38–73)
NRBC BLD-RTO: 0 /100 WBC
PLATELET # BLD AUTO: 163 K/UL (ref 150–450)
PMV BLD AUTO: 10.6 FL (ref 9.2–12.9)
POTASSIUM SERPL-SCNC: 4.2 MMOL/L (ref 3.5–5.1)
PROT SERPL-MCNC: 6.5 G/DL (ref 6–8.4)
RBC # BLD AUTO: 3.68 M/UL (ref 4–5.4)
SODIUM SERPL-SCNC: 141 MMOL/L (ref 136–145)
TROPONIN I SERPL DL<=0.01 NG/ML-MCNC: <0.006 NG/ML (ref 0–0.03)
WBC # BLD AUTO: 5.34 K/UL (ref 3.9–12.7)

## 2024-11-03 PROCEDURE — 85025 COMPLETE CBC W/AUTO DIFF WBC: CPT | Performed by: SURGERY

## 2024-11-03 PROCEDURE — 93010 ELECTROCARDIOGRAM REPORT: CPT | Mod: ,,, | Performed by: INTERNAL MEDICINE

## 2024-11-03 PROCEDURE — 93005 ELECTROCARDIOGRAM TRACING: CPT

## 2024-11-03 PROCEDURE — 84484 ASSAY OF TROPONIN QUANT: CPT | Performed by: SURGERY

## 2024-11-03 PROCEDURE — 99284 EMERGENCY DEPT VISIT MOD MDM: CPT | Mod: 25

## 2024-11-03 PROCEDURE — 80053 COMPREHEN METABOLIC PANEL: CPT | Performed by: SURGERY

## 2024-11-03 NOTE — ED PROVIDER NOTES
Encounter Date: 11/3/2024       History     Chief Complaint   Patient presents with    Fall     Pt states her legs gave out at [employer] after taking tylenol with codiene. Denies loss of consciousness before or after fall     Patient was at awaiting area to going to [employer] and felt a knees buckled and went to the ground without losing consciousness she did not hurt herself on the fall she came in for evaluation    The history is provided by the patient.   Fall  The accident occurred just prior to arrival. The fall occurred while standing. She fell from a height of 3 to 5 ft. She landed on [place]. There was no blood loss. The pain is at a severity of 0/10.     Review of patient's allergies indicates:   Allergen Reactions    Pcn [penicillins] Swelling     Past Medical History:   Diagnosis Date    Abnormal uterine bleeding     Anticoagulant long-term use     Arthritis     Coronary artery disease     Depression     DVT (deep vein thrombosis) in pregnancy     s/p IVC    GERD (gastroesophageal reflux disease)     Hypertension     Postmenopausal bleeding      Past Surgical History:   Procedure Laterality Date    CAROTID STENT      CARPAL TUNNEL RELEASE Left      SECTION      x3    CHOLECYSTECTOMY      COLONOSCOPY N/A 3/15/2024    Procedure: COLONOSCOPY;  Surgeon: Dot Douglas MD;  [facility];  Service: Endoscopy;  Laterality: N/A;    DILATION AND CURETTAGE OF UTERUS  12/7/15    SHERRY FILTER PLACEMENT      TUBAL LIGATION       Family History   Problem Relation Name Age of Onset    Parkinsonism Mother      Heart disease Mother      Hypertension Mother      Stroke Father      Colon cancer Daughter  39     Social History     Tobacco Use    Smoking status: Former     Types: Cigarettes     Passive exposure: Past    Smokeless tobacco: Never   Substance Use Topics    Alcohol use: No     Alcohol/week: 0.0 standard drinks of alcohol    Drug use: No     Review of Systems   Constitutional: Negative.     HENT: Negative.     Eyes: Negative.    Respiratory: Negative.     Cardiovascular: Negative.    Endocrine: Negative.    Genitourinary: Negative.    Musculoskeletal:  Positive for joint swelling.   Allergic/Immunologic: Negative.    Neurological: Negative.    Hematological: Negative.    Psychiatric/Behavioral: Negative.         Physical Exam     Initial Vitals [11/03/24 1332]   BP Pulse Resp Temp SpO2   115/82 78 18 98.5 °F (36.9 °C) 99 %      MAP       --         Physical Exam    Nursing note and vitals reviewed.  Constitutional: She appears well-developed and well-nourished.   HENT:   Head: Normocephalic.   Eyes: Conjunctivae are normal.   Neck:   Normal range of motion.  Cardiovascular:  Normal rate.           Pulmonary/Chest: Breath sounds normal.   Abdominal: Abdomen is soft.   Musculoskeletal:      Cervical back: Normal range of motion.     Neurological: She is alert and oriented to person, place, and time. She has normal strength. GCS score is 15. GCS eye subscore is 4. GCS verbal subscore is 5. GCS motor subscore is 6.   Skin: Skin is warm.   Psychiatric: She has a normal mood and affect. Her behavior is normal. Thought content normal.         ED Course   Procedures  Labs Reviewed   CBC W/ AUTO DIFFERENTIAL - Abnormal       Result Value    WBC 5.34      RBC 3.68 (*)     Hemoglobin 12.1      Hematocrit 37.2       (*)     MCH 32.9 (*)     MCHC 32.5      RDW 13.2      Platelets 163      MPV 10.6      Immature Granulocytes 0.2      Gran # (ANC) 2.6      Immature Grans (Abs) 0.01      Lymph # 2.2      Mono # 0.4      Eos # 0.1      Baso # 0.03      nRBC 0      Gran % 49.4      Lymph % 40.3      Mono % 8.2      Eosinophil % 1.3      Basophil % 0.6      Differential Method Automated     COMPREHENSIVE METABOLIC PANEL    Sodium 141      Potassium 4.2      Chloride 105      CO2 27      Glucose 107      BUN 17      Creatinine 0.8      Calcium 9.5      Total Protein 6.5      Albumin 3.6      Total Bilirubin 0.7       Alkaline Phosphatase 69      AST 19      ALT 22      eGFR >60      Anion Gap 9     TROPONIN I    Troponin I <0.006            Imaging Results    None          Medications - No data to display  Medical Decision Making  Patient came in after knees buckle and she fell she has no injury from the fall her neuro exam is intact a near-syncope workup was done which was normal patient took her blood pressure meds and her blood pressure probably was low when she stood up but is normal now EKG troponin and admission labs are all normal    Amount and/or Complexity of Data Reviewed  Labs: ordered.                                      Clinical Impression:  Final diagnoses:  [R55] Syncope  [R55] Near syncope (Primary)          ED Disposition Condition    Discharge Stable          ED Prescriptions    None       Follow-up Information       Follow up With Specialties Details Why Contact Info    Ramón Finley MD Neurology  As needed [address] [facility] [address]  [facility] [address]  [phone]               RICK Jorge III, MD  11/03/24 8104

## 2024-11-04 LAB
OHS QRS DURATION: 98 MS
OHS QTC CALCULATION: 460 MS

## 2025-02-13 ENCOUNTER — OFFICE VISIT (OUTPATIENT)
Dept: URGENT CARE | Facility: CLINIC | Age: 60
End: 2025-02-13
Payer: MEDICAID

## 2025-02-13 VITALS
HEART RATE: 65 BPM | HEIGHT: 64 IN | WEIGHT: 280 LBS | DIASTOLIC BLOOD PRESSURE: 82 MMHG | OXYGEN SATURATION: 99 % | BODY MASS INDEX: 47.8 KG/M2 | SYSTOLIC BLOOD PRESSURE: 127 MMHG | RESPIRATION RATE: 17 BRPM | TEMPERATURE: 98 F

## 2025-02-13 DIAGNOSIS — M54.50 CHRONIC MIDLINE LOW BACK PAIN WITHOUT SCIATICA: Primary | ICD-10-CM

## 2025-02-13 DIAGNOSIS — G89.29 CHRONIC MIDLINE LOW BACK PAIN WITHOUT SCIATICA: Primary | ICD-10-CM

## 2025-02-13 PROCEDURE — 99214 OFFICE O/P EST MOD 30 MIN: CPT | Mod: S$GLB,,, | Performed by: FAMILY MEDICINE

## 2025-02-13 RX ORDER — DEXTROMETHORPHAN HYDROBROMIDE, GUAIFENESIN 5; 100 MG/5ML; MG/5ML
LIQUID ORAL
COMMUNITY
Start: 2023-06-12

## 2025-02-13 RX ORDER — AMLODIPINE BESYLATE 5 MG/1
5 TABLET ORAL
COMMUNITY

## 2025-02-13 RX ORDER — METHOCARBAMOL 750 MG/1
750 TABLET, FILM COATED ORAL DAILY PRN
COMMUNITY
Start: 2025-01-02

## 2025-02-13 RX ORDER — CHLORZOXAZONE 500 MG/1
500 TABLET ORAL 4 TIMES DAILY PRN
Qty: 40 TABLET | Refills: 0 | Status: SHIPPED | OUTPATIENT
Start: 2025-02-13 | End: 2025-02-23

## 2025-02-13 RX ORDER — GABAPENTIN 300 MG/1
300 CAPSULE ORAL NIGHTLY
COMMUNITY

## 2025-02-13 RX ORDER — KETOROLAC TROMETHAMINE 30 MG/ML
30 INJECTION, SOLUTION INTRAMUSCULAR; INTRAVENOUS ONCE
Status: COMPLETED | OUTPATIENT
Start: 2025-02-13 | End: 2025-02-13

## 2025-02-13 RX ORDER — KETOROLAC TROMETHAMINE 10 MG/1
10 TABLET, FILM COATED ORAL EVERY 6 HOURS
Qty: 20 TABLET | Refills: 0 | Status: SHIPPED | OUTPATIENT
Start: 2025-02-13

## 2025-02-13 RX ADMIN — KETOROLAC TROMETHAMINE 30 MG: 30 INJECTION, SOLUTION INTRAMUSCULAR; INTRAVENOUS at 05:02

## 2025-02-13 NOTE — PROGRESS NOTES
"Subjective:      Patient ID: Carmina Amador is a 59 y.o. female.    Vitals:  height is 5' 4" (1.626 m) and weight is 127 kg (280 lb). Her oral temperature is 97.6 °F (36.4 °C). Her blood pressure is 127/82 and her pulse is 65. Her respiration is 17 and oxygen saturation is 99%.     Chief Complaint: Hip Pain    Pt has recurrent hip and back pain, she has had a flare up of pain since Monday.    Hip Pain   The incident occurred 5 to 7 days ago. The incident occurred at home. There was no injury mechanism. The pain is present in the left hip and right hip. The quality of the pain is described as aching. The pain is at a severity of 10/10. The pain is severe. The pain has been Fluctuating since onset. Pertinent negatives include no numbness or tingling. She reports no foreign bodies present. The symptoms are aggravated by movement and weight bearing. She has tried NSAIDs (prescription pain meds) for the symptoms. The treatment provided no relief.       Constitution: Negative.   HENT: Negative.     Cardiovascular: Negative.    Eyes: Negative.    Respiratory: Negative.     Gastrointestinal: Negative.    Endocrine: negative.   Genitourinary: Negative.    Musculoskeletal: Negative.    Skin: Negative.    Allergic/Immunologic: Negative.    Neurological: Negative.  Negative for numbness.   Hematologic/Lymphatic: Negative.    Psychiatric/Behavioral: Negative.        Objective:     Physical Exam   Constitutional: She is oriented to person, place, and time. She appears well-developed. She is cooperative. No distress.   HENT:   Head: Normocephalic and atraumatic.   Nose: Nose normal.   Mouth/Throat: Oropharynx is clear and moist and mucous membranes are normal.   Eyes: Conjunctivae and lids are normal.   Neck: Trachea normal and phonation normal. Neck supple.   Cardiovascular: Normal rate, regular rhythm, normal heart sounds and normal pulses.   Pulmonary/Chest: Effort normal and breath sounds normal.   Abdominal: Normal " appearance and bowel sounds are normal. She exhibits no mass. Soft.   Musculoskeletal:         General: No deformity.      Thoracic back: She exhibits decreased range of motion and tenderness.        Back:    Neurological: She is alert and oriented to person, place, and time. She has normal strength and normal reflexes. No sensory deficit.   Skin: Skin is warm, dry, intact and not diaphoretic.   Psychiatric: Her speech is normal and behavior is normal. Judgment and thought content normal.   Nursing note and vitals reviewed.      Assessment:     1. Chronic midline low back pain without sciatica        Plan:       Chronic midline low back pain without sciatica  -     ketorolac injection 30 mg  -     ketorolac (TORADOL) 10 mg tablet; Take 1 tablet (10 mg total) by mouth every 6 (six) hours.  Dispense: 20 tablet; Refill: 0  -     chlorzoxazone (PARAFON FORTE) 500 mg Tab; Take 1 tablet (500 mg total) by mouth 4 (four) times daily as needed.  Dispense: 40 tablet; Refill: 0      Please drink plenty of fluids.  Please get plenty of rest.  Please return here or go to the Emergency Department for any concerns or worsening of condition.  If you were prescribed a narcotic medication, do not drive or operate heavy equipment or machinery while taking these medications.  If you were not prescribed an anti-inflammatory medication, and if you do not have any history of stomach/intestinal ulcers, or kidney disease, or are not taking a blood thinner such as Coumadin, Plavix, Pradaxa, Eloquis, or Xaralta for example, it is OK to take over the counter Ibuprofen or Advil or Motrin or Aleve as directed.  Do not take these medications on an empty stomach.  If you lose control of your bowel and/or bladder, please go to the nearest Emergency Department immediately.  If you lose sensation in between your legs by your genitalia and/or rectum, please go to the nearest Emergency Department immediately.  If you lose control or sensation of any  extremity, please go to the nearest Emergency Department immediately.    Moist heat (heating Pad) several times a day to back for relief and comfort.  If you  smoke, please stop smoking.    Please follow up with your primary care doctor or specialist as needed.  Natalie Price MD  [phone]    You must understand that you have received treatment at an Urgent Care facility only, and that you may be  released before all of your medical problems are known or treated. Urgent Care facilities are not equipped to  handle life threatening emergencies. It is recommended that you seek care at an Emergency Department for  further evaluation of worsening or concerning symptoms, or possibly life threatening conditions as  discussed.

## 2025-02-13 NOTE — LETTER
February 13, 2025  Carmina Amador  [address] [place] [id number]                [facility]  [address] [place] [address] A  [facility] [address]  Phone: [phone]  Fax: [phone] Carmina Amador was seen and treated in our Urgent Care department on 2/13/2025. She may return to work in 2 - 3 days.      If you have any questions or concerns, please don't hesitate to call.        Sincerely,        Caleb Murphy MD

## 2025-02-13 NOTE — PATIENT INSTRUCTIONS
Please drink plenty of fluids.  Please get plenty of rest.  Please return here or go to the Emergency Department for any concerns or worsening of condition.  If you were prescribed a narcotic medication, do not drive or operate heavy equipment or machinery while taking these medications.  If you were not prescribed an anti-inflammatory medication, and if you do not have any history of stomach/intestinal ulcers, or kidney disease, or are not taking a blood thinner such as Coumadin, Plavix, Pradaxa, Eloquis, or Xaralta for example, it is OK to take over the counter Ibuprofen or Advil or Motrin or Aleve as directed.  Do not take these medications on an empty stomach.  If you lose control of your bowel and/or bladder, please go to the nearest Emergency Department immediately.  If you lose sensation in between your legs by your genitalia and/or rectum, please go to the nearest Emergency Department immediately.  If you lose control or sensation of any extremity, please go to the nearest Emergency Department immediately.    Moist heat (heating Pad) several times a day to back for relief and comfort.  If you  smoke, please stop smoking.    Please follow up with your primary care doctor or specialist as needed.  Natalie Price MD  [phone]    You must understand that you have received treatment at an Urgent Care facility only, and that you may be  released before all of your medical problems are known or treated. Urgent Care facilities are not equipped to  handle life threatening emergencies. It is recommended that you seek care at an Emergency Department for  further evaluation of worsening or concerning symptoms, or possibly life threatening conditions as  Discussed.    General Neck and Back Pain    Both neck and back pain are usually caused by injury to the muscles or ligaments of the spine. Sometimes the disks that separate each bone of the spine may cause pain by pressing on a nearby nerve. Back and neck pain  may appear after a sudden twisting or bending force (such as in a car accident), or sometimes after a simple awkward movement. In either case, muscle spasm is often present and adds to the pain.  Acute neck and back pain usually gets better in 1 to 2 weeks. Pain related to disk disease, arthritis in the spinal joints or spinal stenosis (narrowing of the spinal canal) can become chronic and last for months or years.  Back and neck pain are common problems. Most people feel better in 1 or 2 weeks, and most of the rest in 1 to 2 months. Most people can remain active.  People experience and describe pain differently.  Pain can be sharp, stabbing, shooting, aching, cramping, or burning  Movement, standing, bending, lifting, sitting, or walking may worsen the pain  Pain can be localized to one spot or area, or it can be more generalized  Pain can spread or radiate upwards, downwards, to the front, or go down your arms  Muscle spasm may occur.  Most of the time mechanical problems with the muscles or spine cause the pain. it is usually caused by an injury, whether known or not, to the muscles or ligaments. While illnesses can cause back pain, it is usually not caused by a serious illness. Pain is usually related to physical activity, whether sports, exercise, work, or normal activity. Sometimes it can occur without an identifiable cause. This can happen simply by stretching or moving wrong, without noting pain at the time. Other causes include:  Overexertion, lifting, pushing, pulling incorrectly or too aggressively.  Sudden twisting, bending or stretching from an accident (car or fall), or accidental movement.  Poor posture  Poor conditioning, lack of regular exercise  Spinal disc disease or arthritis  Stress  Pregnancy, or illness like appendicitis, bladder or kidney infection, pelvic infections   Home care  For neck pain: Use a comfortable pillow that supports the head and keeps the spine in a neutral position. The  position of the head should not be tilted forward or backward.  When in bed, try to find a position of comfort. A firm mattress is best. Try lying flat on your back with pillows under your knees. You can also try lying on your side with your knees bent up towards your chest and a pillow between your knees.  At first, do not try to stretch out the sore spots. If there is a strain, it is not like the good soreness you get after exercising without an injury. In this case, stretching may make it worse.  Avoid prolonged sitting, long car rides or travel. This puts more stress on the lower back than standing or walking.  During the first 24 to 72 hours after an injury, apply an ice pack to the painful area for 20 minutes and then remove it for 20 minutes over a period of 60 to 90 minutes or several times a day.   You can alternate ice and heat therapies. Talk with your healthcare provider about the best treatment for your back or neck pain. As a safety precaution, do not use a heating pad at bedtime. Sleeping with a heating pad can lead to skin burns or tissue damage.  Therapeutic massage can help relax the back and neck muscles without stretching them.  Be aware of safe lifting methods and do not lift anything over 15 pounds until all the pain is gone.  Medications  Talk to your healthcare provider before using medicine, especially if you have other medical problems or are taking other medicines.  You may use over-the-counter medicine to control pain, unless another pain medicine was prescribed. If you have chronic conditions like diabetes, liver or kidney disease, stomach ulcers,  gastrointestinal bleeding, or are taking blood thinner medicines.  Be careful if you are given pain medicines, narcotics, or medicine for muscle spasm. They can cause drowsiness, and can affect your coordination, reflexes, and judgment. Do not drive or operate heavy machinery.  Follow-up care  Follow up with your healthcare provider, or as  advised. Physical therapy or further tests may be needed.  If X-rays were taken, you will be notified of any new findings that may affect your care.  Call 911  Seek emergency medical care if any of the following occur:  Trouble breathing  Confusion  Very drowsy or trouble awakening  Fainting or loss of consciousness  Rapid or very slow heart rate  Loss of bowel or bladder control  When to seek medical advice  Call your healthcare provider right away if any of these occur:  Pain becomes worse or spreads into your arms or legs  Weakness, numbness or pain in one or both arms or legs  Numbness in the groin area  Difficulty walking  Fever of 100.4ºF (38ºC) or higher, or as directed by your healthcare provider  Date Last Reviewed: 7/1/2016 © 2000-2016 [employer]. [address] [place] [id number]. All rights reserved. This information is not intended as a substitute for professional medical care. Always follow your healthcare professional's instructions.      Back Pain (Acute or Chronic)    Back pain is one of the most common problems. The good news is that most people feel better in 1 to 2 weeks, and most of the rest in 1 to 2 months. Most people can remain active.  People experience and describe pain differently; not everyone is the same.  The pain can be sharp, stabbing, shooting, aching, cramping or burning.  Movement, standing, bending, lifting, sitting, or walking may worsen pain.  It can be localized to one spot or area, or it can be more generalized.  It can spread or radiate upwards, to the front, or go down your arms or legs (sciatica).  It can cause muscle spasm.  Most of the time, mechanical problems with the muscles or spine cause the pain. Mechanical problems are usually caused by an injury to the muscles or ligaments. While illness can cause back pain, it is usually not caused by a serious illness. Mechanical problems include:   Physical activity such as sports, exercise, work, or  normal activity  Overexertion, lifting, pushing, pulling incorrectly or too aggressively  Sudden twisting, bending, or stretching from an accident, or accidental movement  Poor posture  Stretching or moving wrong, without noticing pain at the time  Poor coordination, lack of regular exercise (check with your doctor about this)  Spinal disc disease or arthritis  Stress  Pain can also be related to pregnancy, or illness like appendicitis, bladder or kidney infections, pelvic infections, and many other things.  Acute back pain usually gets better in 1 to 2 weeks. Back pain related to disk disease, arthritis in the spinal joints or spinal stenosis (narrowing of the spinal canal) can become chronic and last for months or years.  Unless you had a physical injury (for example, a car accident or fall) X-rays are usually not needed for the initial evaluation of back pain. If pain continues and does not respond to medical treatment, X-rays and other tests may be needed.  Home care  Try these home care recommendations:  When in bed, try to find a position of comfort. A firm mattress is best. Try lying flat on your back with pillows under your knees. You can also try lying on your side with your knees bent up towards your chest and a pillow between your knees.  At first, do not try to stretch out the sore spots. If there is a strain, it is not like the good soreness you get after exercising without an injury. In this case, stretching may make it worse.  Avoid prolong sitting, long car rides, or travel. This puts more stress on the lower back than standing or walking.  During the first 24 to 72 hours after an acute injury or flare up of chronic back pain, apply an ice pack to the painful area for 20 minutes and then remove it for 20 minutes. Do this over a period of 60 to 90 minutes or several times a day. This will reduce swelling and pain. Wrap the ice pack in a thin towel or plastic to protect your skin.  You can start with  ice, then switch to heat. Heat (hot shower, hot bath, or heating pad) reduces pain and works well for muscle spasms. Heat can be applied to the painful area for 20 minutes then remove it for 20 minutes. Do this over a period of 60 to 90 minutes or several times a day. Do not sleep on a heating pad. It can lead to skin burns or tissue damage.  You can alternate ice and heat therapy. Talk with your doctor about the best treatment for your back pain.  Therapeutic massage can help relax the back muscles without stretching them.  Be aware of safe lifting methods and do not lift anything without stretching first.  Medicines  Talk to your doctor before using medicine, especially if you have other medical problems or are taking other medicines.  You may use over-the-counter medicine as directed on the bottle to control pain, unless another pain medicine was prescribed. If you have chronic conditions like diabetes, liver or kidney disease, stomach ulcers, or gastrointestinal bleeding, or are taking blood thinners, talk to your doctor before taking any medicine.  Be careful if you are given a prescription medicines, narcotics, or medicine for muscle spasms. They can cause drowsiness, affect your coordination, reflexes, and judgement. Do not drive or operate heavy machinery.  Follow-up care  Follow up with your healthcare provider, or as advised.   A radiologist will review any X-rays that were taken. Your provide will notify you of any new findings that may affect your care.  Call 911  Call emergency services if any of the following occur:  Trouble breathing  Confusion  Very drowsy or trouble awakening  Fainting or loss of consciousness  Rapid or very slow heart rate  Loss of bowel or bladder control  When to seek medical advice  Call your healthcare provider right away if any of these occur:   Pain becomes worse or spreads to your legs  Weakness or numbness in one or both legs  Numbness in the groin or genital area  Date  Last Reviewed: 7/1/2016 © 2000-2016 [employer]. [address] [place] [address], [place] [address]. All rights reserved. This information is not intended as a substitute for professional medical care. Always follow your healthcare professional's instructions.      Back Care Tips    Caring for your back  These are things you can do to prevent a recurrence of acute back pain and to reduce symptoms from chronic back pain:  Maintain a healthy weight. If you are overweight, losing weight will help most types of back pain.  Exercise is an important part of recovery from most types of back pain. The muscles behind and in front of the spine support the back. This means strengthening both the back muscles and the abdominal muscles will provide better support for your spine.   Swimming and brisk walking are good overall exercises to improve your fitness level.  Practice safe lifting methods (below).  Practice good posture when sitting, standing and walking. Avoid prolonged sitting. This puts more stress on the lower back than standing or walking.  Wear quality shoes with sufficient arch support. Foot and ankle alignment can affect back symptoms. Women should avoid wearing high heels.  Therapeutic massage can help relax the back muscles without stretching them.  During the first 24 to 72 hours after an acute injury or flare-up of chronic back pain, apply an ice pack to the painful area for 20 minutes and then remove it for 20 minutes, over a period of 60 to 90 minutes, or several times a day. As a safety precaution, do not use a heating pad at bedtime. Sleeping on a heating pad can lead to skin burns or tissue damage.  You can alternate ice and heat therapies.  Medications  Talk to your healthcare provider before using medicines, especially if you have other medical problems or are taking other medicines.  You may use acetaminophen or ibuprofen to control pain, unless your healthcare provider prescribed other pain  medicine. If you have chronic conditions like diabetes, liver or kidney disease, stomach ulcers, or gastrointestinal bleeding, or are taking blood thinners, talk with your healthcare provider before taking any medicines.  Be careful if you are given prescription pain medicines, narcotics, or medicine for muscle spasm. They can cause drowsiness, affect your coordination, reflexes, and judgment. Do not drive or operate heavy machinery while taking these types of medicines. Take prescription pain medicine only as prescribed by your healthcare provider.  Lumbar stretch  Here is a simple stretching exercise that will help relax muscle spasm and keep your back more limber. If exercise makes your back pain worse, dont do it.  Lie on your back with your knees bent and both feet on the ground.  Slowly raise your left knee to your chest as you flatten your lower back against the floor. Hold for 5 seconds.  Relax and repeat the exercise with your right knee.  Do 10 of these exercises for each leg.  Safe lifting method  Dont bend over at the waist to lift an object off the floor.  Instead, bend your knees and hips in a squat.   Keep your back and head upright  Hold the object close to your body, directly in front of you.  Straighten your legs to lift the object.   Lower the object to the floor in the reverse fashion.  If you must slide something across the floor, push it.  Posture tips  Sitting  Sit in chairs with straight backs or low-back support. Keep your knees lower than your hips, with your feet flat on the floor.  When driving, sit up straight. Adjust the seat forward so you are not leaning toward the steering wheel.  A small pillow or rolled towel behind your lower back may help if you are driving long distances.   Standing  When standing for long periods, shift most of your weight to one leg at a time. Alternate legs every few minutes.   Sleeping  The best way to sleep is on your side with your knees bent. Put a low  pillow under your head to support your neck in a neutral spine position. Avoid thick pillows that bend your neck to one side. Put a pillow between your legs to further relax your lower back. If you sleep on your back, put pillows under your knees to support your legs in a slightly flexed position. Use a firm mattress. If your mattress sags, replace it, or use a 1/2-inch plywood board under the mattress to add support.  Follow-up care  Follow up with your healthcare provider, or as advised.  If X-rays, a CT scan or an MRI scan were taken, they will be reviewed by a radiologist. You will be notified of any new findings that may affect your care.  Call 911  Seek emergency medical care if any of the following occur:  Trouble breathing  Confusion  Very drowsy  Fainting or loss of consciousness  Rapid or very slow heart rate  Loss of  bowel or bladder control  When to seek medical care  Call your healthcare provider if any of the following occur:  Pain becomes worse or spreads to your arms or legs  Weakness or numbness in one or both arms or legs  Numbness in the groin area  Date Last Reviewed: 6/1/2016  © 3325-9077 [employer]. [address] [place] [address], [place] [address]. All rights reserved. This information is not intended as a substitute for professional medical care. Always follow your healthcare professional's instructions.